# Patient Record
Sex: FEMALE | Race: WHITE | Employment: OTHER | URBAN - METROPOLITAN AREA
[De-identification: names, ages, dates, MRNs, and addresses within clinical notes are randomized per-mention and may not be internally consistent; named-entity substitution may affect disease eponyms.]

---

## 2019-04-03 NOTE — PROGRESS NOTES
Jesus Martinez MD [facility]  [address],  [address]  [place], [address] [facility]    Office [phone]  Fax [phone]  Cell [phone]    Jennifer Acosta is a 68 y.o. female self-referred for evaluation of ongoing evaluation and management of HTN and valvular disease  Assessment  Encounter Diagnoses   Name Primary?  Essential hypertension Yes    Shortness of breath     Nonrheumatic aortic valve insufficiency     Non-rheumatic mitral regurgitation     Severe obesity (HCC)      Recommendations:  Chronic HTN, controlled on combination therapy. Valvular disease with moderate MR and AR by echo 1 year ago in [place]. She has no murmurs suggestive of either. Doubt she has significant valve disease. Will repeat echo in 1 year. She has no significant exertional sxs at a a good functional capacity. She will be seeing Dr. Jenny Byrd in the near future to establish care. Will see her back in 1 year. Follow-up and Dispositions    · Return in about 1 year (around 4/4/2020). Subjective:  Mrs. Anne-Marie Hale recently moved from [place] to [address] [place] [address] 3/15/19 to live closer to her son. She was seen by Dr Arley Kwon in [place]. Stress nuclear study in 2013 was abnormal, leading to a cath, which was reportedly normal. She has chronic HTN and valvular disease with moderate MR/AR by echo a year ago. No hx of HF or AF. She is adherent with Metoprolol and Losartan for HTN. She states she is adjusting to her medications. She has some shortness of breath due to seasonal allergies from time to time. She reports some stress due to changes. She also states her  recently passed away. She is now living in an apartment. She is able to drive herself. She sleeps well at night. Her appetite is good. Patient denies any exertional chest pain,, palpitations, syncope, orthopnea, edema or paroxysmal nocturnal dyspnea. She does not have DM. She has never smoked.      Cardiac risk factors   HTN yes  DM no  Smoking no    Cardiac testing  Echo 2/28/18 - EF 60%, moderate AR, moderate MR, moderate LAE  Aortic duplex 2/28/18  - Normal    No past medical history on file. No Known Allergies      July 2018. Relocated to [facility] from [place]. Review of Systems  Constitutional: Negative for fever, chills, malaise/fatigue and diaphoresis. Respiratory: Negative for cough, hemoptysis, sputum production, shortness of breath and wheezing. Cardiovascular: Negative for chest pain, palpitations, orthopnea, claudication, leg swelling and PND. Gastrointestinal: Negative for heartburn, nausea, vomiting, blood in stool and melena. Genitourinary: Negative for dysuria and flank pain. Musculoskeletal: Negative for joint pain and back pain. Skin: Negative for rash. Neurological: Negative for focal weakness, seizures, loss of consciousness, weakness and headaches. Endo/Heme/Allergies: Does not bruise/bleed easily. Psychiatric/Behavioral: Negative for memory loss. The patient does not have insomnia. Physical Exam  Visit Vitals  /82 (BP 1 Location: Left arm, BP Patient Position: Sitting)   Pulse 68   Ht 4' 10\" (1.473 m)   Wt 171 lb 12.8 oz (77.9 kg)   SpO2 97%   BMI 35.91 kg/m²     Wt Readings from Last 3 Encounters:   04/04/19 171 lb 12.8 oz (77.9 kg)      General - well developed well nourished  Neck - JVP normal, thyroid nl  Cardiac - normal S1, S2, no murmurs, rubs or gallops. No clicks. Occasional extra systoles.    Vascular - carotids without bruits, radials, femorals and pedal pulses equal bilateral  Lungs - clear to auscultation bilaterals, no rales, wheezing or rhonchi  Abd - soft nontender, no HSM, no abd bruits  Extremities - no edema  Skin - no rash  Neuro - nonfocal  Psych - normal mood and affect    Cardiographics  EKG 4/4/19 - SR, normal EKG  Echo 2/28/18 - EF 60%, moderate AR, moderate MR, moderate LAE  Aortic duplex 2/28/18  - Normal    Written by Oscar Valdivia, as dictated by Dr. Veronica Herring.    Veronica Herring MD

## 2019-04-04 ENCOUNTER — OFFICE VISIT (OUTPATIENT)
Dept: CARDIOLOGY CLINIC | Age: 74
End: 2019-04-04

## 2019-04-04 VITALS
BODY MASS INDEX: 36.06 KG/M2 | WEIGHT: 171.8 LBS | DIASTOLIC BLOOD PRESSURE: 82 MMHG | HEIGHT: 58 IN | OXYGEN SATURATION: 97 % | SYSTOLIC BLOOD PRESSURE: 122 MMHG | HEART RATE: 68 BPM

## 2019-04-04 DIAGNOSIS — I34.0 NON-RHEUMATIC MITRAL REGURGITATION: ICD-10-CM

## 2019-04-04 DIAGNOSIS — I35.1 NONRHEUMATIC AORTIC VALVE INSUFFICIENCY: ICD-10-CM

## 2019-04-04 DIAGNOSIS — R06.02 SHORTNESS OF BREATH: ICD-10-CM

## 2019-04-04 DIAGNOSIS — I10 ESSENTIAL HYPERTENSION: Primary | ICD-10-CM

## 2019-04-04 DIAGNOSIS — E66.01 SEVERE OBESITY (HCC): ICD-10-CM

## 2019-04-04 RX ORDER — LOSARTAN POTASSIUM 100 MG/1
100 TABLET ORAL DAILY
COMMUNITY
End: 2020-04-13 | Stop reason: SDUPTHER

## 2019-04-04 RX ORDER — METOPROLOL SUCCINATE 50 MG/1
100 TABLET, EXTENDED RELEASE ORAL DAILY
COMMUNITY
End: 2020-02-04 | Stop reason: SDUPTHER

## 2019-04-04 RX ORDER — HYDROCHLOROTHIAZIDE 25 MG/1
25 TABLET ORAL DAILY
COMMUNITY
End: 2020-05-22 | Stop reason: SDUPTHER

## 2019-04-04 RX ORDER — FLUTICASONE PROPIONATE 50 MCG
2 SPRAY, SUSPENSION (ML) NASAL DAILY
COMMUNITY
End: 2021-05-03

## 2019-04-04 RX ORDER — PRAVASTATIN SODIUM 20 MG/1
20 TABLET ORAL
COMMUNITY
End: 2020-02-04 | Stop reason: SDUPTHER

## 2019-04-04 RX ORDER — CLONAZEPAM 0.5 MG/1
TABLET ORAL
COMMUNITY
End: 2020-04-22

## 2019-04-04 RX ORDER — ALBUTEROL SULFATE 90 UG/1
AEROSOL, METERED RESPIRATORY (INHALATION)
COMMUNITY
End: 2019-09-09 | Stop reason: SDUPTHER

## 2019-04-04 NOTE — PROGRESS NOTES
Patient has shortness of breath sometimes Patient has swelling in feet sometimes Visit Vitals /82 (BP 1 Location: Left arm, BP Patient Position: Sitting) Pulse 68 Ht 4' 10\" (1.473 m) Wt 171 lb 12.8 oz (77.9 kg) SpO2 97% BMI 35.91 kg/m²

## 2019-04-11 ENCOUNTER — OFFICE VISIT (OUTPATIENT)
Dept: FAMILY MEDICINE CLINIC | Age: 74
End: 2019-04-11

## 2019-04-11 VITALS
BODY MASS INDEX: 36.36 KG/M2 | HEART RATE: 52 BPM | SYSTOLIC BLOOD PRESSURE: 132 MMHG | WEIGHT: 173.2 LBS | HEIGHT: 58 IN | RESPIRATION RATE: 18 BRPM | DIASTOLIC BLOOD PRESSURE: 67 MMHG | TEMPERATURE: 97.4 F

## 2019-04-11 DIAGNOSIS — I10 ESSENTIAL HYPERTENSION: Primary | ICD-10-CM

## 2019-04-11 DIAGNOSIS — E66.01 SEVERE OBESITY (HCC): ICD-10-CM

## 2019-04-11 DIAGNOSIS — Z12.11 SCREENING FOR COLON CANCER: ICD-10-CM

## 2019-04-11 NOTE — PROGRESS NOTES
Chief Complaint Patient presents with [facility] 1. Have you been to the ER, urgent care clinic since your last visit? Hospitalized since your last visit? No 
 
2. Have you seen or consulted any other health care providers outside of the [facility] since your last visit? Include any pap smears or colon screening.  No

## 2019-04-11 NOTE — PROGRESS NOTES
Family Medicine Initial Office Visit Patient: Evin Zavaal 1945, 69 y.o., female Encounter Date: 4/11/2019 ASSESSMENT & PLAN 
  ICD-10-CM ICD-9-CM 1. Essential hypertension I10 401.9 2. Screening for colon cancer Z12.11 V76.51 REFERRAL TO GASTROENTEROLOGY 3. Severe obesity (HCC) E66.01 278.01 Orders Placed This Encounter  [facility] Referral Priority:   Routine Referral Type:   Consultation Referral Reason:   Specialty Services Required Referral Location:   [facility]  
  Referred to Provider:   Justin Reed MD  
  Number of Visits Requested:   1 The patient is here today to establish care and is overall doing well. She is following with cardiology, she recently moved to the area and has already establish care We will request records from her prior PCP Reports that she is due for colonoscopy and that she recently had lab work done with her PCP which we will request records of It encouraged her to exercise and follow a healthy diet with a goal of losing weight and maintaining cardiovascular and physical fitness She reports she is up-to-date on mammogram and will request this record She is status post hysterectomy and does not require Pap smears I did discuss with her the shingles vaccine today I will see her back in 4-6 months or on an as-needed basis CHIEF COMPLAINT Chief Complaint Patient presents with Dc Establish Care SUBJECTIVE Keila Murguia is a 69 y.o. female presenting today for establishing care. Recently moved down here from [place] near [facility]. Patient works as retired. 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 Patient lives in a apt with alone Patient was last seen by primary care march 4 (Dr Yasmeen Reyes) Patient last saw a dentist within the last yanna Patient last had eye exam within the last year Saw cardiology in [place], Dr Vanessa Rivera, recently established with Dr Shannon Burden Exercise: not regularly Diet / Weight:not eating the healthiest diet, likes salty foods/crunchy snacks, limited vegetables, she reports she's more of a grazer and likes breakfast. Weight is staying the same Tobacco:no EtOH:3 bottles of wine a week, drinking 1-2 glasses of wine a day Illicit Substances: no 
 
Sexual Activity: not presently History of high blood pressure, well controlled on meds she's on Recenlty lost her son and her  within the span of 2 days--son had an MI and was found dead at home,  was sick but hid it from family Review of Systems A 12 point review of systems was negative except as noted here or in the HPI. OBJECTIVE Visit Vitals /67 (BP 1 Location: Left arm, BP Patient Position: Sitting) Pulse (!) 52 Temp 97.4 °F (36.3 °C) (Oral) Resp 18 Ht 4' 10\" (1.473 m) Wt 173 lb 3.2 oz (78.6 kg) BMI 36.20 kg/m² Physical Exam  
Constitutional: She is oriented to person, place, and time. She appears well-developed and well-nourished. No distress. NAD, Nontoxic, Appears Stated Age, obese HENT:  
Head: Normocephalic and atraumatic. Mouth/Throat: Oropharynx is clear and moist.  
Eyes: Conjunctivae and EOM are normal. Right eye exhibits no discharge. Left eye exhibits no discharge. No scleral icterus. Wearing glasses Neck: Neck supple. Cardiovascular: Regular rhythm and normal heart sounds. No murmur heard. Occasional PVCs, bradycardia today Pulmonary/Chest: Effort normal and breath sounds normal. No stridor. No respiratory distress. She has no wheezes. She has no rales. Abdominal: Soft. Bowel sounds are normal. She exhibits no distension. There is no tenderness. Musculoskeletal: She exhibits no edema or tenderness. Neurological: She is alert and oriented to person, place, and time. Grossly intact CN Skin: Skin is warm and dry. No rash noted. She is not diaphoretic. Psychiatric: She has a normal mood and affect. Her behavior is normal.  
Tearful at times, appropriately so when discussing the loss of her left ones Nursing note and vitals reviewed. No results found for any visits on 04/11/19. HISTORICAL Reviewed and updated today, and as noted below: 
 
Past Medical History:  
Diagnosis Date  Arthritis  Asthma  Calculus of kidney  Congestive heart failure ([facility] [address]  Hypertension History reviewed. No pertinent surgical history. Family History Problem Relation Age of Onset  Cancer Mother  Stroke Father  Heart Disease Father Social History Tobacco Use Smoking Status Never Smoker Smokeless Tobacco Never Used Social History Socioeconomic History  Marital status: SINGLE Spouse name: Not on file  Number of children: Not on file  Years of education: Not on file  Highest education level: Not on file Tobacco Use  Smoking status: Never Smoker  Smokeless tobacco: Never Used Substance and Sexual Activity  Alcohol use: Yes No Known Allergies No results found for any previous visit. Nat Jackson MD 
[address] 04/11/19 9:13 AM 
 
Portions of this note may have been populated using smart dictation software and may have \"sounds-like\" errors present.

## 2019-09-09 ENCOUNTER — OFFICE VISIT (OUTPATIENT)
Dept: FAMILY MEDICINE CLINIC | Age: 74
End: 2019-09-09

## 2019-09-09 VITALS
RESPIRATION RATE: 18 BRPM | TEMPERATURE: 98.1 F | HEIGHT: 58 IN | SYSTOLIC BLOOD PRESSURE: 145 MMHG | HEART RATE: 52 BPM | BODY MASS INDEX: 36.73 KG/M2 | DIASTOLIC BLOOD PRESSURE: 71 MMHG | WEIGHT: 175 LBS | OXYGEN SATURATION: 100 %

## 2019-09-09 DIAGNOSIS — E78.5 HYPERLIPIDEMIA, UNSPECIFIED HYPERLIPIDEMIA TYPE: ICD-10-CM

## 2019-09-09 DIAGNOSIS — Z11.59 ENCOUNTER FOR HEPATITIS C SCREENING TEST FOR LOW RISK PATIENT: ICD-10-CM

## 2019-09-09 DIAGNOSIS — M94.9 DISORDER OF BONE AND CARTILAGE: ICD-10-CM

## 2019-09-09 DIAGNOSIS — M89.9 DISORDER OF BONE AND CARTILAGE: ICD-10-CM

## 2019-09-09 DIAGNOSIS — I10 ESSENTIAL HYPERTENSION: Primary | ICD-10-CM

## 2019-09-09 DIAGNOSIS — Z23 NEED FOR VACCINATION: ICD-10-CM

## 2019-09-09 DIAGNOSIS — N95.1 MENOPAUSAL STATE: ICD-10-CM

## 2019-09-09 DIAGNOSIS — E66.01 SEVERE OBESITY (HCC): ICD-10-CM

## 2019-09-09 DIAGNOSIS — I49.9 CARDIAC ARRHYTHMIA, UNSPECIFIED CARDIAC ARRHYTHMIA TYPE: ICD-10-CM

## 2019-09-09 PROBLEM — K22.70 BARRETT'S ESOPHAGUS: Status: ACTIVE | Noted: 2019-09-09

## 2019-09-09 PROBLEM — K57.30 DIVERTICULAR DISEASE OF COLON: Status: ACTIVE | Noted: 2019-09-09

## 2019-09-09 RX ORDER — ALBUTEROL SULFATE 90 UG/1
2 AEROSOL, METERED RESPIRATORY (INHALATION)
Qty: 1 INHALER | Refills: 1 | Status: SHIPPED | OUTPATIENT
Start: 2019-09-09 | End: 2020-10-25 | Stop reason: SDUPTHER

## 2019-09-09 RX ORDER — VITAMIN E CAP 100 UNIT 100 UNIT
CAP ORAL DAILY
COMMUNITY
End: 2021-08-19

## 2019-09-09 NOTE — PROGRESS NOTES
Chief Complaint   Patient presents with    Hypertension     Follow up     1. Have you been to the ER, urgent care clinic since your last visit? Hospitalized since your last visit? No    2. Have you seen or consulted any other health care providers outside of the [address] [place] [address] since your last visit? Include any pap smears or colon screening. No    Provider notified patient's B/P 145/71.

## 2019-09-09 NOTE — PROGRESS NOTES
Family Medicine Follow-Up Progress Note  Patient: Anabel Zavala  1945, 69 y.o., female  Encounter Date: 2019    ASSESSMENT & PLAN    ICD-10-CM ICD-9-CM    1. Essential hypertension I10 401.9 CBC W/O DIFF      METABOLIC PANEL, COMPREHENSIVE   2. Menopausal state N95.1 627.2 DEXA BONE DENSITY STUDY AXIAL   3. Disorder of bone and cartilage M89.9 733.90 DEXA BONE DENSITY STUDY AXIAL    M94.9     4. Encounter for hepatitis C screening test for low risk patient Z11.59 V73.89 HEPATITIS C AB   5. Need for vaccination Z23 V05.9 pneumococcal 13 naomi conj dip (PREVNAR 13, PF,) 0.5 mL syrg injection      diphtheria-pertussis, acellular,-tetanus (BOOSTRIX TDAP) 2.5-8-5 Lf-mcg-Lf/0.5mL susp susp      varicella-zoster (SHINGRIX ADJUVANT COMPONENT-PF) injection   6. Severe obesity ([facility] [address] E66.01 278.01    7. Cardiac arrhythmia, unspecified cardiac arrhythmia type I49.9 427.9 AMB POC EKG ROUTINE W/ 12 LEADS, INTER & REP   8. Hyperlipidemia, unspecified hyperlipidemia type E78.5 272.4 LIPID PANEL      METABOLIC PANEL, COMPREHENSIVE       Orders Placed This Encounter    DEXA BONE DENSITY STUDY AXIAL     Standing Status:   Future     Standing Expiration Date:   10/9/2020     Order Specific Question:   Reason for Exam     Answer:   screening, post menopausal woman    HEPATITIS C AB    CBC W/O DIFF    LIPID PANEL    METABOLIC PANEL, COMPREHENSIVE    AMB POC EKG ROUTINE W/ 12 LEADS, INTER & REP     Order Specific Question:   Reason for Exam:     Answer:   irreg hr    vitamin e (E GEMS) 100 unit capsule     Sig: Take  by mouth daily.  pneumococcal 13 naomi conj dip (PREVNAR 13, PF,) 0.5 mL syrg injection     Si.5 mL by IntraMUSCular route once for 1 dose. Dispense:  1 Syringe     Refill:  0    diphtheria-pertussis, acellular,-tetanus (BOOSTRIX TDAP) 2.5-8-5 Lf-mcg-Lf/0.5mL susp susp     Si.5 mL by IntraMUSCular route once for 1 dose.  Indications: Treatment to Prevent Diphtheria, Pertussis and Tetanus Dispense:  0.5 mL     Refill:  0    varicella-zoster (SHINGRIX ADJUVANT COMPONENT-PF) injection     Si Each by IntraMUSCular route once for 1 dose. Indications: Prevention of Shingles     Dispense:  1 Each     Refill:  1    albuterol (PROAIR HFA) 90 mcg/actuation inhaler     Sig: Take 2 Puffs by inhalation every six (6) hours as needed for Wheezing. Dispense:  1 Inhaler     Refill:  1       Patient Instructions   Here today in follow-up, blood pressure ranging from low to moderate control at home. Very mildly elevated here in the office today. Perhaps due to stress or anxiety  Many breakthrough beats on auscultation and palpation of pulses and so we grabbed an EKG showing PACs with sinus bradycardia as the underlying rhythm. The patient is asymptomatic  She continues to follow with cardiology  Due for DEXA scan is ordered  Due for hep C screening as ordered  Due for fasting labs as ordered given history of hypertension and also hyperlipidemia  Going to have a grandbaby soon and so I recommend the Tdap if not administered in the last 10 years, I did review records from her prior doctor's office and I did not see a record of this  I also advised the shingles vaccine and also the Prevnar 13. She may also get the flu shot at her pharmacy  Encouraged her to follow a healthy diet, exercise regularly with a goal of moderate weight loss and improved cardiovascular fitness and decrease cardiovascular risk factors  4-month follow-up or sooner on an as-needed basis call with questions or concerns        CHIEF COMPLAINT  Chief Complaint   Patient presents with    Hypertension     Follow up       Luan Morin is a 69 y.o. female presenting today for htn follow up. Reports her bp has \"been crazy\" running from 90s/70s to 135/80s. She has been logging it now for about 2 wks. She has been drinking more etoh in the last 2 wks as well.  She is drinking gin every night for the last 2 wks, drinking 2-3 G&T for the last 2 wks, she wonders if it is related to mood. Reports recently had eye exam about 2 wks ago--no cataracts, screening for glaucoma, no floaters--[facility] in [place] [facility] [employer]  No palpitations, no chest pain or shortness of breath, no leg swelling  Reports she had a DEXA scan sometime in the past but more than 2 years ago  Due for colonoscopy screening, last was done in 2009. The patient plans to set this up and she does still have the paperwork from her last visit  History of hyperlipidemia, we have previously requested records from her prior doctor's office, last lipids drawn 6 months ago showed elevated total cholesterol, elevated LDL cholesterol and a good healthy cholesterol with an HD L of 70    Review of Systems  A 12 point review of systems was negative except as noted here or in the HPI. OBJECTIVE  Visit Vitals  /71 (BP 1 Location: Left arm, BP Patient Position: Sitting)   Pulse (!) 52   Temp 98.1 °F (36.7 °C) (Oral)   Resp 18   Ht 4' 10\" (1.473 m)   Wt 175 lb (79.4 kg)   SpO2 100%   BMI 36.58 kg/m²       Physical Exam   Constitutional: She is oriented to person, place, and time. She appears well-developed and well-nourished. No distress. NAD, Nontoxic, Appears Stated Age, obese   HENT:   Head: Normocephalic and atraumatic. Mouth/Throat: Oropharynx is clear and moist.   Eyes: Conjunctivae and EOM are normal. Right eye exhibits no discharge. Left eye exhibits no discharge. No scleral icterus. Wearing glasses   Neck: Neck supple. Cardiovascular: Regular rhythm, normal heart sounds and intact distal pulses. No murmur heard. Bradycardia with freq ectopic beats   Pulmonary/Chest: Effort normal and breath sounds normal. No stridor. No respiratory distress. She has no wheezes. She has no rales. Abdominal: Soft. Bowel sounds are normal. She exhibits no distension. There is no tenderness. Musculoskeletal: She exhibits no edema or tenderness.    Neurological: She is alert and oriented to person, place, and time. Grossly intact CN   Skin: Skin is warm and dry. No rash noted. She is not diaphoretic. Psychiatric: She has a normal mood and affect. Her behavior is normal.   Tearful at times, reports stressors   Nursing note and vitals reviewed. No results found for any visits on 09/09/19. HISTORICAL  Reviewed and updated today, and as noted below:    Past Medical History:   Diagnosis Date    Arthritis     Asthma     Calculus of kidney     Congestive heart failure ([facility] [address]     Hypertension      History reviewed. No pertinent surgical history. Family History   Problem Relation Age of Onset    Cancer Mother     Stroke Father     Heart Disease Father      Social History     Tobacco Use   Smoking Status Never Smoker   Smokeless Tobacco Never Used     Social History     Socioeconomic History    Marital status: SINGLE     Spouse name: Not on file    Number of children: Not on file    Years of education: Not on file    Highest education level: Not on file   Tobacco Use    Smoking status: Never Smoker    Smokeless tobacco: Never Used   Substance and Sexual Activity    Alcohol use: Yes     No Known Allergies    No visits with results within 3 Month(s) from this visit. Latest known visit with results is:   No results found for any previous visit. Laisha Chi MD  [facility]  09/09/19 10:22 AM    Portions of this note may have been populated using smart dictation software and may have \"sounds-like\" errors present. Pt was counseled on risks, benefits and alternatives of treatment options. All questions were asked and answered and the patient was agreeable with the treatment plan as outlined.

## 2019-09-09 NOTE — PATIENT INSTRUCTIONS
Here today in follow-up, blood pressure ranging from low to moderate control at home. Very mildly elevated here in the office today. Perhaps due to stress or anxiety  Many breakthrough beats on auscultation and palpation of pulses and so we grabbed an EKG showing PACs with sinus bradycardia as the underlying rhythm. The patient is asymptomatic  She continues to follow with cardiology  Due for DEXA scan is ordered  Due for hep C screening as ordered  Due for fasting labs as ordered given history of hypertension and also hyperlipidemia  Going to have a grandbaby soon and so I recommend the Tdap if not administered in the last 10 years, I did review records from her prior doctor's office and I did not see a record of this  I also advised the shingles vaccine and also the Prevnar 13.   She may also get the flu shot at her pharmacy  Encouraged her to follow a healthy diet, exercise regularly with a goal of moderate weight loss and improved cardiovascular fitness and decrease cardiovascular risk factors  4-month follow-up or sooner on an as-needed basis call with questions or concerns

## 2019-09-10 LAB
ALBUMIN SERPL-MCNC: 4.5 G/DL (ref 3.5–4.8)
ALBUMIN/GLOB SERPL: 2 {RATIO} (ref 1.2–2.2)
ALP SERPL-CCNC: 51 IU/L (ref 39–117)
ALT SERPL-CCNC: 12 IU/L (ref 0–32)
AST SERPL-CCNC: 19 IU/L (ref 0–40)
BILIRUB SERPL-MCNC: 0.4 MG/DL (ref 0–1.2)
BUN SERPL-MCNC: 17 MG/DL (ref 8–27)
BUN/CREAT SERPL: 22 (ref 12–28)
CALCIUM SERPL-MCNC: 9.2 MG/DL (ref 8.7–10.3)
CHLORIDE SERPL-SCNC: 101 MMOL/L (ref 96–106)
CHOLEST SERPL-MCNC: 222 MG/DL (ref 100–199)
CO2 SERPL-SCNC: 29 MMOL/L (ref 20–29)
CREAT SERPL-MCNC: 0.78 MG/DL (ref 0.57–1)
ERYTHROCYTE [DISTWIDTH] IN BLOOD BY AUTOMATED COUNT: 12.5 % (ref 12.3–15.4)
GLOBULIN SER CALC-MCNC: 2.2 G/DL (ref 1.5–4.5)
GLUCOSE SERPL-MCNC: 100 MG/DL (ref 65–99)
HCT VFR BLD AUTO: 40.2 % (ref 34–46.6)
HCV AB S/CO SERPL IA: <0.1 S/CO RATIO (ref 0–0.9)
HDLC SERPL-MCNC: 73 MG/DL
HGB BLD-MCNC: 13.2 G/DL (ref 11.1–15.9)
INTERPRETATION, 910389: NORMAL
LDLC SERPL CALC-MCNC: 131 MG/DL (ref 0–99)
MCH RBC QN AUTO: 30.8 PG (ref 26.6–33)
MCHC RBC AUTO-ENTMCNC: 32.8 G/DL (ref 31.5–35.7)
MCV RBC AUTO: 94 FL (ref 79–97)
PLATELET # BLD AUTO: 218 X10E3/UL (ref 150–450)
POTASSIUM SERPL-SCNC: 4.4 MMOL/L (ref 3.5–5.2)
PROT SERPL-MCNC: 6.7 G/DL (ref 6–8.5)
RBC # BLD AUTO: 4.28 X10E6/UL (ref 3.77–5.28)
SODIUM SERPL-SCNC: 143 MMOL/L (ref 134–144)
TRIGL SERPL-MCNC: 92 MG/DL (ref 0–149)
VLDLC SERPL CALC-MCNC: 18 MG/DL (ref 5–40)
WBC # BLD AUTO: 7.5 X10E3/UL (ref 3.4–10.8)

## 2019-09-13 NOTE — PROGRESS NOTES
Hepatitis C screening negative  Blood counts all normal  Cholesterol LDL is elevated making total cholesterol elevated, already on pravastatin, no change to medications  Electrolytes kidney function and liver function all normal, blood sugar at the upper limit of normal we will recheck this with the next set of labs

## 2020-02-06 RX ORDER — PRAVASTATIN SODIUM 20 MG/1
20 TABLET ORAL
Qty: 90 TAB | Refills: 1 | Status: SHIPPED | OUTPATIENT
Start: 2020-02-06 | End: 2020-08-03 | Stop reason: SDUPTHER

## 2020-02-06 RX ORDER — PRAVASTATIN SODIUM 20 MG/1
20 TABLET ORAL
Qty: 90 TAB | Refills: 1 | OUTPATIENT
Start: 2020-02-06

## 2020-02-06 RX ORDER — METOPROLOL SUCCINATE 50 MG/1
100 TABLET, EXTENDED RELEASE ORAL DAILY
Qty: 180 TAB | Refills: 1 | Status: SHIPPED | OUTPATIENT
Start: 2020-02-06 | End: 2020-08-03 | Stop reason: SDUPTHER

## 2020-04-14 RX ORDER — CLONAZEPAM 0.5 MG/1
0.5 TABLET ORAL
Qty: 30 TAB | Refills: 0 | OUTPATIENT
Start: 2020-04-14

## 2020-04-14 RX ORDER — LOSARTAN POTASSIUM 100 MG/1
100 TABLET ORAL DAILY
Qty: 90 TAB | Refills: 0 | Status: SHIPPED | OUTPATIENT
Start: 2020-04-14 | End: 2020-07-22 | Stop reason: SDUPTHER

## 2020-04-14 NOTE — TELEPHONE ENCOUNTER
Never had clonazepam from our office. Needs visit to discuss, can do virtual. Losartan approved.  Please schedule pt for virtual visit

## 2020-04-14 NOTE — TELEPHONE ENCOUNTER
Pt returned call, states she's already scheduled for virtual visit next week and is okay to wait for refill until then as this is only an as needed medication. Pt agrees to call office for sooner  appointment if needed.  Aristides

## 2020-04-14 NOTE — TELEPHONE ENCOUNTER
Called pt and left a voice message, asking that she call the office back in regards to her medications.

## 2020-04-22 ENCOUNTER — VIRTUAL VISIT (OUTPATIENT)
Dept: FAMILY MEDICINE CLINIC | Age: 75
End: 2020-04-22

## 2020-04-22 VITALS
WEIGHT: 175 LBS | DIASTOLIC BLOOD PRESSURE: 80 MMHG | HEIGHT: 58 IN | BODY MASS INDEX: 36.73 KG/M2 | SYSTOLIC BLOOD PRESSURE: 123 MMHG

## 2020-04-22 DIAGNOSIS — K22.719 BARRETT'S ESOPHAGUS WITH DYSPLASIA: ICD-10-CM

## 2020-04-22 DIAGNOSIS — K57.30 DIVERTICULAR DISEASE OF COLON: ICD-10-CM

## 2020-04-22 DIAGNOSIS — E66.01 SEVERE OBESITY (HCC): ICD-10-CM

## 2020-04-22 DIAGNOSIS — F41.8 DEPRESSION WITH ANXIETY: ICD-10-CM

## 2020-04-22 DIAGNOSIS — Z71.89 ADVANCED DIRECTIVES, COUNSELING/DISCUSSION: ICD-10-CM

## 2020-04-22 DIAGNOSIS — Z13.31 SCREENING FOR DEPRESSION: ICD-10-CM

## 2020-04-22 DIAGNOSIS — Z12.39 BREAST CANCER SCREENING: ICD-10-CM

## 2020-04-22 DIAGNOSIS — G47.00 INSOMNIA, UNSPECIFIED TYPE: ICD-10-CM

## 2020-04-22 DIAGNOSIS — Z00.00 MEDICARE ANNUAL WELLNESS VISIT, SUBSEQUENT: Primary | ICD-10-CM

## 2020-04-22 DIAGNOSIS — Z12.11 SCREEN FOR COLON CANCER: ICD-10-CM

## 2020-04-22 DIAGNOSIS — Z13.39 SCREENING FOR ALCOHOLISM: ICD-10-CM

## 2020-04-22 DIAGNOSIS — Z12.31 ENCOUNTER FOR SCREENING MAMMOGRAM FOR MALIGNANT NEOPLASM OF BREAST: ICD-10-CM

## 2020-04-22 DIAGNOSIS — F10.90 HABITUAL ALCOHOL USE: ICD-10-CM

## 2020-04-22 DIAGNOSIS — E78.5 HYPERLIPIDEMIA, UNSPECIFIED HYPERLIPIDEMIA TYPE: ICD-10-CM

## 2020-04-22 DIAGNOSIS — I10 BENIGN ESSENTIAL HYPERTENSION: ICD-10-CM

## 2020-04-22 RX ORDER — CITALOPRAM 20 MG/1
TABLET, FILM COATED ORAL
Qty: 90 TAB | Refills: 0 | Status: SHIPPED | OUTPATIENT
Start: 2020-04-22 | End: 2020-07-22 | Stop reason: SDUPTHER

## 2020-04-22 NOTE — PATIENT INSTRUCTIONS
1. Medicare annual wellness visit, subsequent Done today per protocol 2. Breast cancer screening Due, ordered, defer until routine imaging has resumed after loosening of COVID 19 restrictions - [facility] MAMMO BI SCREENING INCL CAD; Future 3. Screening for alcoholism See chart, ordered and counselin discussed - [place] ANNUAL ALCOHOL SCREEN 15 MIN 4. Screening for depression Per protocol, + PHQ, discussed and see below - Beena Mcfarland 5. Screen for colon cancer Defer cscope for cologuard for now b/c of risk for COVID19 exposure 
- COLOGUARD TEST (FECAL DNA COLORECTAL CANCER SCREENING) 6. Body mass index 36.0-36.9, adult Encourage on weight loss through healthy eating, exercise, with a goal of moderate weight loss and modification of cv risk factors - [place] BEHAVIOR  OBESITY 15M 7. Advanced directives, counseling/discussion See documentation, patient to bring her ACP document to our office or fax - ADVANCE CARE PLANNING FIRST 30 MINS 8. Encounter for screening mammogram for malignant neoplasm of breast  
As noted above - [facility] MAMMO BI SCREENING INCL CAD; Future 9. Benign essential hypertension HTN appears to be at goal, encourage med compliance and lifestyle changes as documented above 10. Cat's esophagus with dysplasia F/u with GI when able after COVID19 restrictions are loosened 11. Diverticular disease of colon As above 12. Hyperlipidemia, unspecified hyperlipidemia type C/w medication without changes, labs due in september 13. Severe obesity ([address] As above 14. Habitual alcohol use Counseling today, recommend limit to 7 or less drinks per week and monitor for interaction with medications (klonopin not safe with etoh for example) 15. Depression with anxiety Long discussion, determined med mgmt is appropriate.  Pt agrees to SSRI, which if anxiety is causing insomnia may help with insomnia in addition to depression and anxiety. Discuss choices, decided to try celexa. Discussed r/b/a and patient agreeable to titration plan, start with 1/2 tab for 2 - 4 wk then can increase to 20mg after that, 4-6 wk follow up appt for med check 16. Insomnia, unspecified type As above, avoid benzos, can trial melatnonin, unisom Encounter time today including face to face, chart review and documentation was 54 minutes and more than 50% of this encounter was spent in counseling face-to-face regarding Diagnosis, Patient Education, Medication Management, Compliance and Impressions.

## 2020-04-22 NOTE — PROGRESS NOTES
Pt is completing virtual appointment at in [place], [place]. Pt advises she had immunizations, TDAP and pneumococcal at [facility], will request records. This is the Subsequent Medicare Annual Wellness Exam, performed 12 months or more after the Initial AWV or the last Subsequent AWV    Consent: Kulwant Enamorado, who was seen by synchronous (real-time) audio-video technology, and/or her healthcare decision maker, is aware that this patient-initiated, Telehealth encounter on 4/22/2020 is a billable service. While AWVs are fully covered by Medicare, any services rendered on this date that are not included in an AWV are subject to additional billing, with coverage as determined by her insurance carrier. She is aware that she may receive a bill for any such additional services and has provided verbal consent to proceed: Yes. I have reviewed the patient's medical history in detail and updated the computerized patient record. History     Patient Active Problem List   Diagnosis Code    Severe obesity ([facility] [address] E66.01    Benign essential hypertension I10    Cat's esophagus K22.70    Diverticular disease of colon K57.30    Hyperlipidemia E78.5     Past Medical History:   Diagnosis Date    Arthritis     Asthma     Calculus of kidney     Congestive heart failure ([facility] [address]     Hypertension       History reviewed. No pertinent surgical history. Current Outpatient Medications   Medication Sig Dispense Refill    losartan (COZAAR) 100 mg tablet Take 1 Tab by mouth daily. 90 Tab 0    metoprolol succinate (TOPROL-XL) 50 mg XL tablet Take 2 Tabs by mouth daily. 180 Tab 1    pravastatin (PRAVACHOL) 20 mg tablet Take 1 Tab by mouth nightly. 90 Tab 1    vitamin e (E GEMS) 100 unit capsule Take  by mouth daily.  albuterol (PROAIR HFA) 90 mcg/actuation inhaler Take 2 Puffs by inhalation every six (6) hours as needed for Wheezing.  1 Inhaler 1    hydroCHLOROthiazide (HYDRODIURIL) 25 mg tablet Take 25 mg by mouth daily.      fluticasone propionate (FLONASE ALLERGY RELIEF) 50 mcg/actuation nasal spray 2 Sprays by Both Nostrils route daily.  cholecalciferol, vitamin D3, (VITAMIN D3 PO) Take  by mouth.  clonazePAM (KLONOPIN) 0.5 mg tablet Take  by mouth nightly as needed.        No Known Allergies    Family History   Problem Relation Age of Onset    Cancer Mother     Stroke Father     Heart Disease Father      Social History     Tobacco Use    Smoking status: Never Smoker    Smokeless tobacco: Never Used   Substance Use Topics    Alcohol use: Yes       Depression Risk Factor Screening:     3 most recent PHQ Screens 9/9/2019   Little interest or pleasure in doing things More than half the days   Feeling down, depressed, irritable, or hopeless More than half the days   Total Score PHQ 2 4   Trouble falling or staying asleep, or sleeping too much Nearly every day   Feeling tired or having little energy Not at all   Poor appetite, weight loss, or overeating Not at all   Feeling bad about yourself - or that you are a failure or have let yourself or your family down More than half the days   Trouble concentrating on things such as school, work, reading, or watching TV Not at all   Moving or speaking so slowly that other people could have noticed; or the opposite being so fidgety that others notice Not at all   Thoughts of being better off dead, or hurting yourself in some way Not at all   PHQ 9 Score 9   How difficult have these problems made it for you to do your work, take care of your home and get along with others Somewhat difficult       Alcohol Risk Factor Screening:   Do you average 1 drink per night or more than 7 drinks a week:  No    On any one occasion in the past three months have you have had more than 3 drinks containing alcohol:  Yes    AUDIT Screen Score: AUDIT Score: 7  Patient is drinking gin, 2 drinks per day, 4 days per week--not measuring out shots  Functional Ability and Level of Safety: Hearing: Hearing is good. Activities of Daily Living: The home contains: grab bars    ADL Assessment 4/22/2020   Feeding yourself No Help Needed   Getting from bed to chair No Help Needed   Getting dressed No Help Needed   Bathing or showering No Help Needed   Walk across the room (includes cane/walker) No Help Needed   Using the telphone No Help Needed   Taking your medications No Help Needed   Preparing meals No Help Needed   Managing money (expenses/bills) No Help Needed   Moderately strenuous housework (laundry) No Help Needed   Shopping for personal items (toiletries/medicines) No Help Needed   Shopping for groceries No Help Needed   Driving No Help Needed   Climbing a flight of stairs No Help Needed   Getting to places beyond walking distances No Help Needed       Ambulation: with no difficulty    Fall Risk:  Fall Risk Assessment, last 12 mths 9/9/2019   Able to walk? Yes   Fall in past 12 months? No       Abuse Screen:  Abuse Screening Questionnaire 4/22/2020   Do you ever feel afraid of your partner? N   Are you in a relationship with someone who physically or mentally threatens you? N   Is it safe for you to go home?  Y     Cognitive Screening   Has your family/caregiver stated any concerns about your memory: no  Cognitive Screening: Normal - Verbal Fluency Test  Tavo   Super  Sit  Size  Set  Don Hires  [place]  Sitting  Staying  [address]  [employer]  Sports  Sporting  Sports arena  seagrams  Suffer  Slight  silly  Patient Care Team   Patient Care Team:  Augustus Pathak MD as PCP - General (Family Practice)  Augustus Pathak MD as PCP - [facility] Empaneled Provider    Assessment/Plan   Education and counseling provided:  Are appropriate based on today's review and evaluation  End-of-Life planning (with patient's consent)  Pneumococcal Vaccine  Influenza Vaccine  Screening Mammography  Colorectal cancer screening tests  Screening for glaucoma  Diabetes screening test    Diagnoses and all orders for this visit:    1. Breast cancer screening  -     EDILIA MAMMO BI SCREENING INCL CAD; Future    2. Medicare annual wellness visit, subsequent    3. Screening for alcoholism  -     NE ANNUAL ALCOHOL SCREEN 15 MIN    4. Screening for depression  -     DEPRESSION SCREEN ANNUAL    5. Screen for colon cancer  -     COLOGUARD TEST (FECAL DNA COLORECTAL CANCER SCREENING)    6. Body mass index 36.0-36.9, adult  -     NE BEHAVIOR  OBESITY 15M    7. Advanced directives, counseling/discussion  -     ADVANCE CARE PLANNING FIRST 30 MINS    8. Encounter for screening mammogram for malignant neoplasm of breast   -     EDILIA MAMMO BI SCREENING INCL CAD; Future        Health Maintenance Due   Topic Date Due    DTaP/Tdap/Td series (1 - Tdap) 04/11/1967    Breast Cancer Screen Mammogram  04/11/1996    FOBT Q1Y Age 50-75  04/11/1996    Bone Densitometry (Dexa) Screening  04/11/2011    Pneumococcal 65+ years (1 of 1 - PPSV23) 04/11/2011    Medicare Yearly Exam  03/27/2019       Riley Zavala is a 77 y.o. female being evaluated by a video visit encounter for concerns as above. A caregiver was present when appropriate. Due to this being a TeleHealth encounter (During [facility]- public health emergency), evaluation of the following organ systems was limited: Vitals/Constitutional/EENT/Resp/CV/GI//MS/Neuro/Skin/Heme-Lymph-Imm. Pursuant to the emergency declaration under the [facility]5 waiver authority and the [employer] and [employer]ar General Act, this Virtual  Visit was conducted, with patient's (and/or legal guardian's) consent, to reduce the patient's risk of exposure to COVID-19 and provide necessary medical care. Services were provided through a video synchronous discussion virtually to substitute for in-person clinic visit. Patient and provider were located at their individual homes.     Yenni Ibarra MD

## 2020-04-22 NOTE — PROGRESS NOTES
Advance Care Planning       Advance Care Planning (ACP) Physician/NP/PA (Provider) Conversation      Date of ACP Conversation: 4/22/2020    Conversation Conducted with:   Patient with Decision Making Capacity    *If present, Decision Maker was asked to consider and make decisions based on patient values, known preferences, or best interests. Current Designated Health Care Decision Maker:   (as entered in [address] field)    If no Decision Maker listed above or available through scanned documents, then:    [address]:  Who do you trust to make healthcare decisions for you? Name: Humberto Walker (Daughter) phone  number: [phone]  Can this person be reached and be able to respond quickly, such as within a few minutes or hours? YES  Who would be your back-up decision maker? Name Donita Zavala phone [phone], [phone] (cell)      For below questions, when conducting conversation with Micaela, substitute \"she\" and \"her\" for \"you\" and \"your\". Hospitalization: \"If your health were to worsen and it became clear that your chance of recovery was unlikely, what would your preference be regarding hospitalization? \"  If the patient would want hospitalization, answer \"yes\". If the patient would prefer comfort-focused treatment without hospitalization, answer \"no\". yes, if care is futile, she requests a quick transition to comfort care and hospice      Ventilation: \"If you were in your present state of health and suddenly became very ill and were unable to breathe on your own, what would your preference be about the use of a ventilator (breathing machine) if it were available to you? \"    If patient would desire the use of a ventilator (breathing machine), answer \"yes\", if not answer \"no\":yes, as a temporary measure, patient stipulates if no improvement in 2 days, discontinue care, in favor of comfort measures.   \"If your health were to worsen and it became clear that your chance of recovery was unlikely, would that change your answer? \"   yes, if no chance of recovery, patient does not want to be placed on a ventilator    Resuscitation:  \"CPR works best to restart the heart when there is a sudden event, like a heart attack, in someone who is otherwise healthy. Unfortunately, CPR does not typically restart the heart for people who have serious health conditions or who are very sick. \"    \"In the event your heart stopped, would you want attempts to restart your heart (answer \"yes\" for attempt to resuscitate) or would you prefer a natural death (answer \"no\" for do not attempt to resuscitate)? \"   no  \"If your health were to worsen and it became clear that your chance of recovery was unlikely, would that change your answer? \"   no        Conversation Outcomes / Follow-Up Plan:   Recommend patient provide our office with a copy of her existing advanced directive which we discussed today. Length of ACP Conversation in minutes:  16 minutes    Coding Instructions:  ACP is eligible as a billable service through Medicare in conjunction with either an AWV or a problem-oriented E/M visit if it entails dedicated discussion >/=16 minutes. Other insurance carriers may or may not cover ACP services.   16-30 minutes: code 73879  >30 minutes: code [id number] modifier if reported with an E/M visit (deductible and co-pays may apply)  Add 33 modifier if reported with an AWV to waive patient cost-sharing

## 2020-04-22 NOTE — PROGRESS NOTES
Family Medicine Follow-Up Progress Note  Patient: Harpreet Zavala  1945, 77 y.o., female  Encounter Date: 4/22/2020    ASSESSMENT & PLAN    ICD-10-CM ICD-9-CM    1. Medicare annual wellness visit, subsequent Z00.00 V70.0    2. Breast cancer screening Z12.39 V76.10 EDILIA MAMMO BI SCREENING INCL CAD   3. Screening for alcoholism Z13.39 V79.1 IN ANNUAL ALCOHOL SCREEN 15 MIN   4. Screening for depression Z13.31 V79.0 DEPRESSION SCREEN ANNUAL   5. Screen for colon cancer Z12.11 V76.51 COLOGUARD TEST (FECAL DNA COLORECTAL CANCER SCREENING)   6. Body mass index 36.0-36.9, adult Z68.36 V85.36 IN BEHAVIOR  OBESITY 15M   7. Advanced directives, counseling/discussion Z71.89 V65.49 ADVANCE CARE PLANNING FIRST 30 MINS   8. Encounter for screening mammogram for malignant neoplasm of breast  Z12.31 V76.12 EDILIA MAMMO BI SCREENING INCL CAD   9. Benign essential hypertension I10 401.1    10. Cat's esophagus with dysplasia K22.719 530.85    11. Diverticular disease of colon K57.30 562.10    12. Hyperlipidemia, unspecified hyperlipidemia type E78.5 272.4    13. Severe obesity (HCC) E66.01 278.01    14. Habitual alcohol use Z72.89 V49.89    15. Depression with anxiety F41.8 300.4    16. Insomnia, unspecified type G47.00 780.52        Orders Placed This Encounter    Depression Screen Annual    ADVANCE CARE PLANNING FIRST 27 MINS    EDILIA MAMMO BI SCREENING INCL CAD     Standing Status:   Future     Standing Expiration Date:   5/22/2021    COLOGUARD TEST (FECAL DNA COLORECTAL CANCER SCREENING)    Annual  Alcohol Screen 15 min ()    Intense Behavioral Therapy for Obesity 15 min ()     citalopram (CELEXA) 20 mg tablet     Sig: Take 0.5 Tabs by mouth daily for 14 days, THEN 1 Tab daily for 90 days. Dispense:  90 Tab     Refill:  0       Patient Instructions   1. Medicare annual wellness visit, subsequent  Done today per protocol    2.  Breast cancer screening  Due, ordered, defer until routine imaging has resumed after loosening of COVID 19 restrictions  - [facility] MAMMO BI SCREENING INCL CAD; Future    3. Screening for alcoholism  See chart, ordered and counselin discussed  - [place] ANNUAL ALCOHOL SCREEN 15 MIN    4. Screening for depression  Per protocol, + PHQ, discussed and see below  - Beena Mcfarland    5. Screen for colon cancer  Defer cscope for cologuard for now b/c of risk for COVID19 exposure  - COLOGUARD TEST (FECAL DNA COLORECTAL CANCER SCREENING)    6. Body mass index 36.0-36.9, adult  Encourage on weight loss through healthy eating, exercise, with a goal of moderate weight loss and modification of cv risk factors  - AR BEHAVIOR  OBESITY 15M    7. Advanced directives, counseling/discussion  See documentation, patient to bring her ACP document to our office or fax  - ADVANCE CARE PLANNING FIRST 30 MINS    8. Encounter for screening mammogram for malignant neoplasm of breast   As noted above  - [facility] MAMMO BI SCREENING INCL CAD; Future    9. Benign essential hypertension  HTN appears to be at goal, encourage med compliance and lifestyle changes as documented above    10. Cat's esophagus with dysplasia  F/u with GI when able after COVID19 restrictions are loosened    11. Diverticular disease of colon  As above    12. Hyperlipidemia, unspecified hyperlipidemia type  C/w medication without changes, labs due in september    13. Severe obesity ([address]  As above    14. Habitual alcohol use  Counseling today, recommend limit to 7 or less drinks per week and monitor for interaction with medications (klonopin not safe with etoh for example)    15. Depression with anxiety  Long discussion, determined med mgmt is appropriate. Pt agrees to SSRI, which if anxiety is causing insomnia may help with insomnia in addition to depression and anxiety. Discuss choices, decided to try celexa.  Discussed r/b/a and patient agreeable to titration plan, start with 1/2 tab for 2 - 4 wk then can increase to 20mg after that, 4-6 wk follow up appt for med check    16. Insomnia, unspecified type  As above, avoid benzos, can trial melatnonin, unisom    Encounter time today including face to face, chart review and documentation was 54 minutes and more than 50% of this encounter was spent in counseling face-to-face regarding Diagnosis, Patient Education, Medication Management, Compliance and Impressions. CHIEF COMPLAINT  Chief Complaint   Patient presents with   [facility] Annual Wellness Visit       SUBJECTIVE  Mackenzie Zavala is a 77 y.o. female presenting today for AWV and also acute and chronic concerns  She reports knee pain is longstanding and hip pain comes and goes. She reports to me that the hip xray showed a kidney stone and nothing else but it continues to bother her. The knee xray int he past shows arthrits  The pain is annoying but not urgent, no recent injury or trauma--patient would be willing to wait for now on seeing ortho or having imaging--wants to wait until it is safer to go into a medical setting. HTN: patient reports stable on medications. No chest pain, sob, headache, blurred vision, leg swelling, diaphoresis, falls. Compliant with medications as prescribed. is checking blood pressures at home and reports range is 120-140s/60-80s. No significant hyper or hypotensive episodes that the patient reports today. Allergies: so far so good, she reports she's having no significant symptoms. She is using that albuterol pump from time to time, she does home PFT and uses albuterol if it drops low. She also uses the incentive spirometer. She is only using saline nasal spray for now. HLD: taking her cholesterol medication regularly, no side effects but compliant with meds    Insomnia/anxiety: she reports she falls asleep and she wakes up and in the past she would use klonopin 0.25 2-3 days a night.   Has never tried unisom or other otc sleep aids    Depression: PHQ 9 Score: 9 (9/9/2019 10:10 AM)  would want to be on medication--she was on zoloft in the past and it did not help. Also said she had been on xanax in the past and she had bad nightmares with it. She has been using klonopin but knows it's not ideal. Lost her  and son within 2 days of eachother and this continues to be difficult for her, especially during this time of isolation. Habitual etoh use: drinking 8+ drinks per week. Prev was drinking wine, now drinking gin. Reports she does not measure her drinks so probably each drink has more than a shot in it. Reports she \"could stop but doesn't want to. \" she knows that liver damage comes from etoh chronic use at higher levels and she reports that was a compliaction leading to her 's death. ROS  Review of Systems  A 12 point review of systems was negative except as noted here or in the HPI. OBJECTIVE  Visit Vitals  /80   Ht 4' 10\" (1.473 m)   Wt 175 lb (79.4 kg)   BMI 36.58 kg/m²       Physical Exam  Constitutional:       General: She is not in acute distress. Appearance: Normal appearance. She is not ill-appearing, toxic-appearing or diaphoretic. HENT:      Head: Normocephalic and atraumatic. Mouth/Throat:      Mouth: Mucous membranes are moist.   Eyes:      General: No scleral icterus. Right eye: No discharge. Left eye: No discharge. Extraocular Movements: Extraocular movements intact. Neck:      Comments: Neck appears to have rom without noticeable mass  Pulmonary:      Effort: Pulmonary effort is normal. No respiratory distress. Skin:     Comments: Not jaundice, bruising, lesion, pallor, erythema or rash on visible skin. Skin appears dry and well perfused   Neurological:      General: No focal deficit present. Mental Status: She is alert and oriented to person, place, and time. Psychiatric:         Behavior: Behavior normal.         Thought Content:  Thought content normal.         Judgment: Judgment normal.      Comments: Appears depressed No results found for any visits on 04/22/20. HISTORICAL  Reviewed and updated today, and as noted below:    Past Medical History:   Diagnosis Date    Arthritis     Asthma     Calculus of kidney     Congestive heart failure ([address]     Hypertension      History reviewed. No pertinent surgical history. Family History   Problem Relation Age of Onset    Cancer Mother     Stroke Father     Heart Disease Father      Social History     Tobacco Use   Smoking Status Never Smoker   Smokeless Tobacco Never Used     Social History     Socioeconomic History    Marital status: SINGLE     Spouse name: Not on file    Number of children: Not on file    Years of education: Not on file    Highest education level: Not on file   Tobacco Use    Smoking status: Never Smoker    Smokeless tobacco: Never Used   Substance and Sexual Activity    Alcohol use: Yes     No Known Allergies    LAB REVIEW  Lab Results   Component Value Date/Time    Sodium 143 09/09/2019 11:53 AM    Potassium 4.4 09/09/2019 11:53 AM    Chloride 101 09/09/2019 11:53 AM    CO2 29 09/09/2019 11:53 AM    Glucose 100 (H) 09/09/2019 11:53 AM    BUN 17 09/09/2019 11:53 AM    Creatinine 0.78 09/09/2019 11:53 AM    BUN/Creatinine ratio 22 09/09/2019 11:53 AM    GFR est AA 87 09/09/2019 11:53 AM    GFR est non-AA 76 09/09/2019 11:53 AM    Calcium 9.2 09/09/2019 11:53 AM    Bilirubin, total 0.4 09/09/2019 11:53 AM    AST (SGOT) 19 09/09/2019 11:53 AM    Alk.  phosphatase 51 09/09/2019 11:53 AM    Protein, total 6.7 09/09/2019 11:53 AM    Albumin 4.5 09/09/2019 11:53 AM    A-G Ratio 2.0 09/09/2019 11:53 AM    ALT (SGPT) 12 09/09/2019 11:53 AM     Lab Results   Component Value Date/Time    WBC 7.5 09/09/2019 11:53 AM    HGB 13.2 09/09/2019 11:53 AM    HCT 40.2 09/09/2019 11:53 AM    PLATELET 258 16/68/4780 11:53 AM    MCV 94 09/09/2019 11:53 AM     No results found for: HBA1C, HGBE8, NFP0OHNE, PJU6MLRN, LHS8RVAK  Lab Results   Component Value Date/Time Cholesterol, total 222 (H) 09/09/2019 11:53 AM    HDL Cholesterol 73 09/09/2019 11:53 AM    LDL, calculated 131 (H) 09/09/2019 11:53 AM    VLDL, calculated 18 09/09/2019 11:53 AM    Triglyceride 92 09/09/2019 11:53 AM           Leilani Isidro MD  [facility]  04/22/20 11:25 AM    Portions of this note may have been populated using smart dictation software and may have \"sounds-like\" errors present. Pt was counseled on risks, benefits and alternatives of treatment options. All questions were asked and answered and the patient was agreeable with the treatment plan as outlined.

## 2020-05-22 RX ORDER — HYDROCHLOROTHIAZIDE 25 MG/1
25 TABLET ORAL DAILY
Qty: 90 TAB | Refills: 0 | Status: SHIPPED | OUTPATIENT
Start: 2020-05-22 | End: 2020-08-03 | Stop reason: SDUPTHER

## 2020-07-23 RX ORDER — CITALOPRAM 20 MG/1
20 TABLET, FILM COATED ORAL DAILY
Qty: 90 TAB | Refills: 1 | Status: SHIPPED | OUTPATIENT
Start: 2020-07-23 | End: 2021-01-25

## 2020-07-23 RX ORDER — LOSARTAN POTASSIUM 100 MG/1
100 TABLET ORAL DAILY
Qty: 90 TAB | Refills: 1 | Status: SHIPPED | OUTPATIENT
Start: 2020-07-23 | End: 2021-01-25 | Stop reason: SDUPTHER

## 2020-08-04 RX ORDER — PRAVASTATIN SODIUM 20 MG/1
20 TABLET ORAL
Qty: 90 TAB | Refills: 1 | Status: SHIPPED | OUTPATIENT
Start: 2020-08-04 | End: 2021-02-02

## 2020-08-04 RX ORDER — HYDROCHLOROTHIAZIDE 25 MG/1
25 TABLET ORAL DAILY
Qty: 90 TAB | Refills: 0 | Status: SHIPPED | OUTPATIENT
Start: 2020-08-04 | End: 2020-11-23

## 2020-08-04 RX ORDER — METOPROLOL SUCCINATE 50 MG/1
100 TABLET, EXTENDED RELEASE ORAL DAILY
Qty: 180 TAB | Refills: 1 | Status: SHIPPED | OUTPATIENT
Start: 2020-08-04 | End: 2021-02-16 | Stop reason: SDUPTHER

## 2020-10-26 RX ORDER — ALBUTEROL SULFATE 90 UG/1
2 AEROSOL, METERED RESPIRATORY (INHALATION)
Qty: 1 INHALER | Refills: 1 | Status: SHIPPED | OUTPATIENT
Start: 2020-10-26 | End: 2021-04-01 | Stop reason: SDUPTHER

## 2020-11-23 RX ORDER — HYDROCHLOROTHIAZIDE 25 MG/1
TABLET ORAL
Qty: 90 TAB | Refills: 0 | Status: SHIPPED | OUTPATIENT
Start: 2020-11-23 | End: 2020-12-02 | Stop reason: SDUPTHER

## 2020-11-25 NOTE — TELEPHONE ENCOUNTER
Called pt, and left a voice message, advising she is due to have labs drawn prior to additional medication refills. Advised pt to call office back to discuss.

## 2020-12-01 RX ORDER — HYDROCHLOROTHIAZIDE 25 MG/1
TABLET ORAL
Qty: 90 TAB | Refills: 0 | OUTPATIENT
Start: 2020-12-01

## 2020-12-02 ENCOUNTER — TELEPHONE (OUTPATIENT)
Dept: FAMILY MEDICINE CLINIC | Age: 75
End: 2020-12-02

## 2020-12-02 RX ORDER — HYDROCHLOROTHIAZIDE 25 MG/1
25 TABLET ORAL DAILY
Qty: 90 TAB | Refills: 1 | Status: SHIPPED | OUTPATIENT
Start: 2020-12-02 | End: 2021-02-16 | Stop reason: SDUPTHER

## 2020-12-02 NOTE — TELEPHONE ENCOUNTER
Called pt, and left a voice message, asking that she call the office back in regards to her  medication. ~After pt has been been advised of the need for labs, information will need to be forwarded to Dr. Karin Brooke so lab orders can be placed.

## 2020-12-02 NOTE — TELEPHONE ENCOUNTER
Pt called stating she's out of her hydrochlorothiazide after being advised prescription was approved on 11/23/20 by Dr. Carmella Wick, has gone to pharmacy twice and they don't have it.      Disp  Refills  Start  End     hydroCHLOROthiazide (HYDRODIURIL) 25 mg tablet  90 Tab  0  11/23/2020      Sig: Take 1 tablet by mouth once daily     Sent to pharmacy as: hydroCHLOROthiazide 25 mg tablet (HYDRODIURIL)     E-Prescribing Status: Transmission to pharmacy failed (11/23/2020 12:42 PM EST)

## 2020-12-03 RX ORDER — HYDROCHLOROTHIAZIDE 25 MG/1
TABLET ORAL
Qty: 90 TAB | Refills: 0 | OUTPATIENT
Start: 2020-12-03

## 2020-12-03 NOTE — TELEPHONE ENCOUNTER
Sent again yesterday, but requested after I can see it was confirmed received, pls call [facility], confirm script with them, there may have been some trouble with transmission over the last 2 wks when i've been trying to send it?

## 2020-12-04 ENCOUNTER — TELEPHONE (OUTPATIENT)
Dept: FAMILY MEDICINE CLINIC | Age: 75
End: 2020-12-04

## 2020-12-04 DIAGNOSIS — F41.8 DEPRESSION WITH ANXIETY: ICD-10-CM

## 2020-12-04 DIAGNOSIS — I10 BENIGN ESSENTIAL HYPERTENSION: ICD-10-CM

## 2020-12-04 DIAGNOSIS — E78.5 HYPERLIPIDEMIA, UNSPECIFIED HYPERLIPIDEMIA TYPE: Primary | ICD-10-CM

## 2020-12-04 DIAGNOSIS — K22.719 BARRETT'S ESOPHAGUS WITH DYSPLASIA: ICD-10-CM

## 2020-12-28 DIAGNOSIS — E78.5 HYPERLIPIDEMIA, UNSPECIFIED HYPERLIPIDEMIA TYPE: ICD-10-CM

## 2020-12-28 DIAGNOSIS — I10 BENIGN ESSENTIAL HYPERTENSION: ICD-10-CM

## 2020-12-28 DIAGNOSIS — K22.719 BARRETT'S ESOPHAGUS WITH DYSPLASIA: ICD-10-CM

## 2020-12-28 DIAGNOSIS — F41.8 DEPRESSION WITH ANXIETY: ICD-10-CM

## 2020-12-28 NOTE — TELEPHONE ENCOUNTER
You  Natasha Zavala Just now (9:01 AM)        Good Morning,   Apologizes for that. It appears The message was not sent to Dr. Carmella Wick. I have sent to her and once ordered I will let you know and will fax to lab of your choosing. This [employer] message has not been read. Ish Zavala MD 2 days ago        Hi,   I am till waiting for blood work paper work. My concern is I am running low on a few medication. Thanks for your help. Mao Platting            You  Natasha Zavala 3 weeks ago        Good Morning,   Yes, you will need lab orders. I will ask Dr. Carmella Wick to order your labs and we will mail to your home.    Thank you,   Ish Pinon MD 3 weeks ago        Do I need anything from the  to get a blood test  or other test ?

## 2020-12-28 NOTE — TELEPHONE ENCOUNTER
----- Message from [place]. Sally sent at 12/26/2020  1:22 PM EST -----  Regarding: RE: Non-Urgent Medical Question  Contact: [phone]  Hi,   I am till waiting for blood work paper work. My concern is I am running low on a few medication. Thanks for your help.   Kelton Johnson

## 2020-12-29 NOTE — TELEPHONE ENCOUNTER
Called pt, and left a voice message, advising I was calling he to ask which medication need to be refilled at this time. Advised pt to send to me on RF nanohart or she can call back and let me know.

## 2021-01-01 LAB
ALBUMIN SERPL-MCNC: 4.3 G/DL (ref 3.7–4.7)
ALBUMIN/GLOB SERPL: 1.8 {RATIO} (ref 1.2–2.2)
ALP SERPL-CCNC: 67 IU/L (ref 39–117)
ALT SERPL-CCNC: 21 IU/L (ref 0–32)
AST SERPL-CCNC: 29 IU/L (ref 0–40)
BILIRUB SERPL-MCNC: 0.5 MG/DL (ref 0–1.2)
BUN SERPL-MCNC: 17 MG/DL (ref 8–27)
BUN/CREAT SERPL: 21 (ref 12–28)
CALCIUM SERPL-MCNC: 9.3 MG/DL (ref 8.7–10.3)
CHLORIDE SERPL-SCNC: 98 MMOL/L (ref 96–106)
CHOLEST SERPL-MCNC: 182 MG/DL (ref 100–199)
CO2 SERPL-SCNC: 30 MMOL/L (ref 20–29)
CREAT SERPL-MCNC: 0.81 MG/DL (ref 0.57–1)
ERYTHROCYTE [DISTWIDTH] IN BLOOD BY AUTOMATED COUNT: 12 % (ref 11.7–15.4)
GLOBULIN SER CALC-MCNC: 2.4 G/DL (ref 1.5–4.5)
GLUCOSE SERPL-MCNC: 102 MG/DL (ref 65–99)
HCT VFR BLD AUTO: 38.4 % (ref 34–46.6)
HDLC SERPL-MCNC: 62 MG/DL
HGB BLD-MCNC: 12.9 G/DL (ref 11.1–15.9)
INTERPRETATION, 910389: NORMAL
LDLC SERPL CALC-MCNC: 92 MG/DL (ref 0–99)
MCH RBC QN AUTO: 32.6 PG (ref 26.6–33)
MCHC RBC AUTO-ENTMCNC: 33.6 G/DL (ref 31.5–35.7)
MCV RBC AUTO: 97 FL (ref 79–97)
PLATELET # BLD AUTO: 252 X10E3/UL (ref 150–450)
POTASSIUM SERPL-SCNC: 4.7 MMOL/L (ref 3.5–5.2)
PROT SERPL-MCNC: 6.7 G/DL (ref 6–8.5)
RBC # BLD AUTO: 3.96 X10E6/UL (ref 3.77–5.28)
SODIUM SERPL-SCNC: 143 MMOL/L (ref 134–144)
TRIGL SERPL-MCNC: 166 MG/DL (ref 0–149)
VLDLC SERPL CALC-MCNC: 28 MG/DL (ref 5–40)
WBC # BLD AUTO: 7.6 X10E3/UL (ref 3.4–10.8)

## 2021-01-04 NOTE — PROGRESS NOTES
Normal blood counts  Borderline stable blood sugar (ok) and normal electrolytes, liver and kidney functions  All cholesterol values better EXCEPT the triglycerides just slightly higher than the goal (<150 is goal, yours are 166) you do not \"Get a ticket\" for this, overall labs look good  Eat a healthy diet and exercise as you are able! Keep up the good work! Return call to patient who reports he developed diarrhea and rectal bleeding that began while he was in [place] 1/6/20.  Patient reports quite a bit of bright red blood with each bowel movement and one incident of nighttime incontinence with stool and blood on his sheets.   Patient denies N/V or abdominal cramping or pain.    Patient is on warfarin daily.    Patient is being treated for prostate cancer with Lupron every three months and per patient had XRT one month ago however last XRT in chart was in May, 2019.    Patient is usually good historian however in this call could not recall his cancer type.   Above discussed with LILIANA Nichole who advises patient should be evaluated in the ER.      Patient contacted and made aware and verbalized understanding.

## 2021-01-14 ENCOUNTER — HOSPITAL ENCOUNTER (EMERGENCY)
Age: 76
Discharge: HOME | End: 2021-01-14
Attending: EMERGENCY MEDICINE
Payer: MEDICARE

## 2021-01-14 ENCOUNTER — APPOINTMENT (OUTPATIENT)
Dept: GENERAL RADIOLOGY | Age: 76
End: 2021-01-14
Attending: EMERGENCY MEDICINE
Payer: MEDICARE

## 2021-01-14 VITALS
DIASTOLIC BLOOD PRESSURE: 87 MMHG | SYSTOLIC BLOOD PRESSURE: 145 MMHG | HEART RATE: 94 BPM | RESPIRATION RATE: 15 BRPM | OXYGEN SATURATION: 97 % | HEIGHT: 58 IN | WEIGHT: 180.56 LBS | TEMPERATURE: 97.5 F | BODY MASS INDEX: 37.9 KG/M2

## 2021-01-14 DIAGNOSIS — S22.41XA CLOSED FRACTURE OF MULTIPLE RIBS OF RIGHT SIDE, INITIAL ENCOUNTER: Primary | ICD-10-CM

## 2021-01-14 PROCEDURE — 99282 EMERGENCY DEPT VISIT SF MDM: CPT

## 2021-01-14 PROCEDURE — 71101 X-RAY EXAM UNILAT RIBS/CHEST: CPT

## 2021-01-14 RX ORDER — HYDROCODONE BITARTRATE AND ACETAMINOPHEN 5; 325 MG/1; MG/1
1 TABLET ORAL
Qty: 15 TAB | Refills: 0 | Status: SHIPPED | OUTPATIENT
Start: 2021-01-14 | End: 2021-01-18 | Stop reason: SDUPTHER

## 2021-01-14 NOTE — ED NOTES
The patient was discharged home by Dr Tyrese Johns in stable condition. The patient is alert and oriented, in no respiratory distress. The patient's diagnosis, condition and treatment were explained. The patient expressed understanding. A discharge plan has been developed. A  was not involved in the process. Aftercare instructions were given. Pt ambulatory out of the ED.

## 2021-01-14 NOTE — ED TRIAGE NOTES
On Tuesday pt tripped up a curb and fell on her right breast. She denies LOC. Her right arm, side/back hurts intermittently.  Pt has been taking Advil, took 400mg at 8am

## 2021-01-14 NOTE — ED PROVIDER NOTES
Date of Service:  1/14/2021    Patient:  Angelina Zavala    Chief Complaint:  Rib Pain and Arm Pain       HPI:  Angelina Zavala is a 77 y.o.  female who presents for evaluation of rib pain after fall. Patient had a mechanical fall 2 days ago tripping landing on her right breast and right side. Patient's had some right-sided rib pain present intermittently since the fall. Worse with deep breaths or certain positioning. She denies any type of head neck or back pain. There is no loss of consciousness or prodromal symptoms. No fevers chills. No shortness of breath cough abdominal pain chest pain otherwise. No other acute complaints. Pain is at 11/10. Past Medical History:   Diagnosis Date    Arthritis     Asthma     Calculus of kidney     Congestive heart failure ([facility] [address]     Hypertension        No past surgical history on file. Family History:   Problem Relation Age of Onset    Cancer Mother     Stroke Father     Heart Disease Father        Social History     Socioeconomic History    Marital status: SINGLE     Spouse name: Not on file    Number of children: Not on file    Years of education: Not on file    Highest education level: Not on file   Occupational History    Not on file   Social Needs    Financial resource strain: Not on file    Food insecurity     Worry: Not on file     Inability: Not on file    Transportation needs     Medical: Not on file     Non-medical: Not on file   Tobacco Use    Smoking status: Never Smoker    Smokeless tobacco: Never Used   Substance and Sexual Activity    Alcohol use:  Yes    Drug use: Not on file    Sexual activity: Not on file   Lifestyle    Physical activity     Days per week: Not on file     Minutes per session: Not on file    Stress: Not on file   Relationships    Social connections     Talks on phone: Not on file     Gets together: Not on file     Attends Cheondoism service: Not on file     Active member of club or organization: Not on file     Attends meetings of clubs or organizations: Not on file     Relationship status: Not on file    Intimate partner violence     Fear of current or ex partner: Not on file     Emotionally abused: Not on file     Physically abused: Not on file     Forced sexual activity: Not on file   Other Topics Concern    Not on file   Social History Narrative    Not on file         ALLERGIES: Patient has no known allergies. Review of Systems   Constitutional: Negative for fever. HENT: Negative for hearing loss. Eyes: Negative for visual disturbance. Respiratory: Negative for cough and shortness of breath. Cardiovascular: Positive for chest pain. Gastrointestinal: Negative for abdominal pain. Genitourinary: Negative for flank pain. Musculoskeletal: Negative for back pain. Skin: Positive for color change. Negative for rash. Neurological: Negative for dizziness and light-headedness. Psychiatric/Behavioral: Negative for confusion. Vitals:    01/14/21 1114   BP: (!) 145/87   Pulse: 94   Resp: 15   Temp: 97.5 °F (36.4 °C)   SpO2: 97%   Weight: 81.9 kg (180 lb 8.9 oz)   Height: 4' 10\" (1.473 m)            Physical Exam  Vitals signs and nursing note reviewed. Exam conducted with a chaperone present. Constitutional:       Appearance: Normal appearance. HENT:      Head: Normocephalic and atraumatic. Nose: Nose normal. No congestion. Mouth/Throat:      Mouth: Mucous membranes are moist.      Pharynx: No oropharyngeal exudate. Eyes:      General: No scleral icterus. Cardiovascular:      Rate and Rhythm: Normal rate. Pulses: Normal pulses. Heart sounds: No murmur. Pulmonary:      Effort: Pulmonary effort is normal. No respiratory distress. Breath sounds: No stridor. No wheezing, rhonchi or rales. Abdominal:      General: Abdomen is flat. Tenderness: There is no abdominal tenderness. Musculoskeletal: Normal range of motion.       Comments: Tenderness over the right rib cage, lateral   Skin:     General: Skin is warm. Capillary Refill: Capillary refill takes less than 2 seconds. Comments: Bruising to medial right breast, non tender   Neurological:      Mental Status: She is alert and oriented to person, place, and time. Psychiatric:         Mood and Affect: Mood normal.          [facility]     VITAL SIGNS:  Patient Vitals for the past 4 hrs:   Temp Pulse Resp BP SpO2   01/14/21 1114 97.5 °F (36.4 °C) 94 15 (!) 145/87 97 %         LABS:  No results found for this or any previous visit (from the past 6 hour(s)). IMAGING:  XR RIBS RT W PA CXR MIN 3 V   Final Result   IMPRESSION:  Right rib fractures. Medications During Visit:  Medications - No data to display      DECISION MAKING:  Ammon Zavala is a 77 y.o. female who comes in as above. Diagnostics as above. I will provide incentive spirometer and medication for pain. Patient to follow with her primary care doctor. Return as needed. Patient agreeable to plan. We have also discussed over-the-counter pain patches      IMPRESSION:  1. Closed fracture of multiple ribs of right side, initial encounter        DISPOSITION:  Discharged      Current Discharge Medication List      START taking these medications    Details   HYDROcodone-acetaminophen (Norco) 5-325 mg per tablet Take 1 Tab by mouth every four (4) hours as needed for Pain for up to 3 days. Max Daily Amount: 6 Tabs. Qty: 15 Tab, Refills: 0    Associated Diagnoses: Closed fracture of multiple ribs of right side, initial encounter              Follow-up Information     Follow up With Specialties Details Why Contact Info    Joshua Renteria MD Family Medicine Schedule an appointment as soon as possible for a visit   [address] [place] [address]  [phone]              The patient is asked to follow-up with their primary care provider in the next several days. They are to call tomorrow for an appointment. The patient is asked to return promptly for any increased concerns or worsening of symptoms. They can return to this emergency department or any other emergency department.     Procedures

## 2021-01-18 ENCOUNTER — VIRTUAL VISIT (OUTPATIENT)
Dept: FAMILY MEDICINE CLINIC | Age: 76
End: 2021-01-18
Payer: MEDICARE

## 2021-01-18 DIAGNOSIS — S22.41XA CLOSED FRACTURE OF MULTIPLE RIBS OF RIGHT SIDE, INITIAL ENCOUNTER: ICD-10-CM

## 2021-01-18 PROCEDURE — 1090F PRES/ABSN URINE INCON ASSESS: CPT | Performed by: FAMILY MEDICINE

## 2021-01-18 PROCEDURE — G8510 SCR DEP NEG, NO PLAN REQD: HCPCS | Performed by: FAMILY MEDICINE

## 2021-01-18 PROCEDURE — G8427 DOCREV CUR MEDS BY ELIG CLIN: HCPCS | Performed by: FAMILY MEDICINE

## 2021-01-18 PROCEDURE — 3017F COLORECTAL CA SCREEN DOC REV: CPT | Performed by: FAMILY MEDICINE

## 2021-01-18 PROCEDURE — G8400 PT W/DXA NO RESULTS DOC: HCPCS | Performed by: FAMILY MEDICINE

## 2021-01-18 PROCEDURE — 99213 OFFICE O/P EST LOW 20 MIN: CPT | Performed by: FAMILY MEDICINE

## 2021-01-18 PROCEDURE — G8756 NO BP MEASURE DOC: HCPCS | Performed by: FAMILY MEDICINE

## 2021-01-18 PROCEDURE — G9899 SCRN MAM PERF RSLTS DOC: HCPCS | Performed by: FAMILY MEDICINE

## 2021-01-18 PROCEDURE — 1101F PT FALLS ASSESS-DOCD LE1/YR: CPT | Performed by: FAMILY MEDICINE

## 2021-01-18 RX ORDER — HYDROCODONE BITARTRATE AND ACETAMINOPHEN 5; 325 MG/1; MG/1
1 TABLET ORAL
Qty: 28 TAB | Refills: 0 | Status: SHIPPED | OUTPATIENT
Start: 2021-01-18 | End: 2021-01-25

## 2021-01-18 RX ORDER — LIDOCAINE 50 MG/G
PATCH TOPICAL
Qty: 15 EACH | Refills: 0 | Status: SHIPPED | OUTPATIENT
Start: 2021-01-18 | End: 2021-08-19

## 2021-01-18 NOTE — PROGRESS NOTES
Rhys Klinefelter Dermer is a 77 y.o. female who was seen by synchronous (real-time) audio-video technology on 1/18/2021. Consent: Rhys Klinefelter Dermer, who was seen by synchronous (real-time) audio-video technology, and/or her healthcare decision maker, is aware that this patient-initiated, Telehealth encounter on 1/18/2021 is a billable service, with coverage as determined by her insurance carrier. She is aware that she may receive a bill and has provided verbal consent to proceed: Yes. Assessment & Plan:   1. Closed fracture of multiple ribs of right side, initial encounter  ATC advil 600  Norco PRN  Lido patch  Supportive care  Precautions for care reivewed   appropriate    - HYDROcodone-acetaminophen (Norco) 5-325 mg per tablet; Take 1 Tab by mouth every six (6) hours as needed for Pain for up to 7 days. Max Daily Amount: 4 Tabs. Dispense: 28 Tab; Refill: 0  - lidocaine (LIDODERM) 5 %; Apply patch to the affected area for 12 hours a day and remove for 12 hours a day. Dispense: 15 Each; Refill: 0          Pt was counseled on risks, benefits and alternatives of treatment options. All questions were asked and answered and the patient was agreeable with the treatment plan as outlined.     Subjective:   Rhys Klinefelter Dermer is a 77 y.o. female who was seen for Fall (01/12/2021) and Rib Injury (7th and 8th )      The patient went down to her storage unit and she reports that there was a little incline, she was wearing slippers, her shoe caught the ground and she went down on her right side  She reports it took her breathe away, she was able to get up, go home and get to the er  R lateral 6th and 7th rib fractures, no pneumo, pleural effusion  Pain today--depends on activity, sometimes it is a 10, sometimes it hovers around an 8    Taking norco when really needed  Taking advil taking 600 mg when she remembers, not more than 4 h   Medications, allergies, PMH, PSH, SOCH, [facility] reviewed and updated per routine protocol, see chart for review and changes if not noted here. ROS  A 12 point review of systems was negative except as noted here or in the HPI.     Objective:   Vital Signs: (As obtained by patient/caregiver at home)  Patient-Reported Vitals 1/18/2021   Patient-Reported Weight 180lb   Patient-Reported Temperature 97.5   Patient-Reported SpO2 98   Patient-Reported Systolic  081   Patient-Reported Diastolic 81        [INSTRUCTIONS:  \"[x]\" Indicates a positive item  \"[]\" Indicates a negative item  -- DELETE ALL ITEMS NOT EXAMINED]    Constitutional: [x] Appears well-developed and well-nourished [x] No apparent distress      [] Abnormal -     Mental status: [x] Alert and awake  [x] Oriented to person/place/time [x] Able to follow commands    [] Abnormal -     Eyes:   EOM    [x]  Normal    [] Abnormal -   Sclera  [x]  Normal    [] Abnormal -          Discharge [x]  None visible   [] Abnormal -     HENT: [x] Normocephalic, atraumatic  [] Abnormal -   [x] Mouth/Throat: Mucous membranes are moist    External Ears [x] Normal  [] Abnormal -    Neck: [x] No visualized mass [] Abnormal -     Pulmonary/Chest: [x] Respiratory effort normal   [x] No visualized signs of difficulty breathing or respiratory distress        [] Abnormal -      Musculoskeletal:   [x] Normal gait with no signs of ataxia         [x] Normal range of motion of neck        [] Abnormal -     Neurological:        [x] No Facial Asymmetry (Cranial nerve 7 motor function) (limited exam due to video visit)          [x] No gaze palsy        [] Abnormal -          Skin:        [x] No significant exanthematous lesions or discoloration noted on facial skin         [] Abnormal -            Psychiatric:       [x] Normal Affect [] Abnormal -        [x] No Hallucinations    Other pertinent observable physical exam findings:pain with cough/laugh notable otherwise no acute distress, non toxic and well appearing    We discussed the expected course, resolution and complications of the diagnosis(es) in detail. Medication risks, benefits, costs, interactions, and alternatives were discussed as indicated. I advised her to contact the office if her condition worsens, changes or fails to improve as anticipated. She expressed understanding with the diagnosis(es) and plan. Karin Zavala is a 77 y.o. female who was evaluated by a video visit encounter for concerns as above. Patient identification was verified prior to start of the visit. A caregiver was present when appropriate. Due to this being a TeleHealth encounter (During FAIYH-45 public health emergency), evaluation of the following organ systems was limited: Vitals/Constitutional/EENT/Resp/CV/GI//MS/Neuro/Skin/Heme-Lymph-Imm. Pursuant to the emergency declaration under the [address] [place] [address], [facility] waiver authority and the [employer] Act, this Virtual  Visit was conducted, with patient's (and/or legal guardian's) consent, to reduce the patient's risk of exposure to COVID-19 and provide necessary medical care. Services were provided through a video synchronous discussion virtually to substitute for in-person clinic visit. Patient and provider were located at their individual homes. Antoine Enamorado MD  [facility]  01/18/21 10:16 AM     Portions of this note may have been populated using smart dictation software and may have \"sounds-like\" errors present.

## 2021-01-18 NOTE — PROGRESS NOTES
Chief Complaint   Patient presents with   Rex Nunes     01/12/2021    Rib Injury     7th and 8th      1. Have you been to the ER, urgent care clinic since your last visit? Hospitalized since your last visit? Yes, 01/14/2021 [address] [place], [place] [address]Wilmaron Jacinto on 01/12/2021 broke 2 ribs. 2. Have you seen or consulted any other health care providers outside of the [address] [place] [address] since your last visit? Include any pap smears or colon screening.  No

## 2021-01-25 RX ORDER — CITALOPRAM 20 MG/1
TABLET, FILM COATED ORAL
Qty: 90 TAB | Refills: 0 | Status: SHIPPED | OUTPATIENT
Start: 2021-01-25 | End: 2021-05-03 | Stop reason: SDUPTHER

## 2021-01-27 RX ORDER — LOSARTAN POTASSIUM 100 MG/1
100 TABLET ORAL DAILY
Qty: 90 TAB | Refills: 1 | Status: SHIPPED | OUTPATIENT
Start: 2021-01-27 | End: 2021-02-02 | Stop reason: SDUPTHER

## 2021-01-27 RX ORDER — CITALOPRAM 20 MG/1
20 TABLET, FILM COATED ORAL DAILY
Qty: 90 TAB | Refills: 1 | Status: SHIPPED | OUTPATIENT
Start: 2021-01-27 | End: 2021-05-03 | Stop reason: SDUPTHER

## 2021-02-02 ENCOUNTER — PATIENT MESSAGE (OUTPATIENT)
Dept: FAMILY MEDICINE CLINIC | Age: 76
End: 2021-02-02

## 2021-02-02 DIAGNOSIS — Z84.81 FAMILY HISTORY OF BRCA2 GENE POSITIVE: ICD-10-CM

## 2021-02-02 DIAGNOSIS — Z91.89 AT HIGH RISK FOR BREAST CANCER: Primary | ICD-10-CM

## 2021-02-02 RX ORDER — PRAVASTATIN SODIUM 20 MG/1
TABLET ORAL
Qty: 90 TAB | Refills: 0 | Status: SHIPPED | OUTPATIENT
Start: 2021-02-02 | End: 2021-05-03 | Stop reason: SDUPTHER

## 2021-02-02 RX ORDER — LOSARTAN POTASSIUM 100 MG/1
100 TABLET ORAL DAILY
Qty: 90 TAB | Refills: 1 | Status: SHIPPED | OUTPATIENT
Start: 2021-02-02 | End: 2021-05-03 | Stop reason: SDUPTHER

## 2021-02-02 NOTE — TELEPHONE ENCOUNTER
From: Sierra Zavala  To: Meghan Escoto MD  Sent: 2/2/2021 11:32 AM EST  Subject: Update Medical Information    I just found out my sister and her daughter (my niece) have tested positive for the BRCA gene. It is BRCA 2 positive. I would like this updated to my medical history please. Thank you.  Wilton Kapadia

## 2021-02-17 RX ORDER — METOPROLOL SUCCINATE 50 MG/1
100 TABLET, EXTENDED RELEASE ORAL DAILY
Qty: 180 TAB | Refills: 1 | Status: SHIPPED | OUTPATIENT
Start: 2021-02-17 | End: 2021-05-03 | Stop reason: SDUPTHER

## 2021-02-17 RX ORDER — HYDROCHLOROTHIAZIDE 25 MG/1
25 TABLET ORAL DAILY
Qty: 90 TAB | Refills: 1 | Status: SHIPPED | OUTPATIENT
Start: 2021-02-17 | End: 2021-05-03 | Stop reason: SDUPTHER

## 2021-02-23 ENCOUNTER — HOSPITAL ENCOUNTER (OUTPATIENT)
Dept: MAMMOGRAPHY | Age: 76
Discharge: HOME | End: 2021-02-23
Attending: FAMILY MEDICINE
Payer: MEDICARE

## 2021-02-23 DIAGNOSIS — Z12.39 BREAST CANCER SCREENING: ICD-10-CM

## 2021-02-23 DIAGNOSIS — Z12.31 ENCOUNTER FOR SCREENING MAMMOGRAM FOR MALIGNANT NEOPLASM OF BREAST: ICD-10-CM

## 2021-02-23 PROCEDURE — 77067 SCR MAMMO BI INCL CAD: CPT

## 2021-04-01 RX ORDER — ALBUTEROL SULFATE 90 UG/1
2 AEROSOL, METERED RESPIRATORY (INHALATION)
Qty: 1 INHALER | Refills: 1 | Status: SHIPPED | OUTPATIENT
Start: 2021-04-01 | End: 2021-08-19 | Stop reason: SDUPTHER

## 2021-05-03 ENCOUNTER — VIRTUAL VISIT (OUTPATIENT)
Dept: FAMILY MEDICINE CLINIC | Age: 76
End: 2021-05-03
Payer: MEDICARE

## 2021-05-03 DIAGNOSIS — I34.0 NON-RHEUMATIC MITRAL REGURGITATION: ICD-10-CM

## 2021-05-03 DIAGNOSIS — Z13.39 SCREENING FOR ALCOHOLISM: ICD-10-CM

## 2021-05-03 DIAGNOSIS — F41.8 DEPRESSION WITH ANXIETY: ICD-10-CM

## 2021-05-03 DIAGNOSIS — I49.9 CARDIAC ARRHYTHMIA, UNSPECIFIED CARDIAC ARRHYTHMIA TYPE: ICD-10-CM

## 2021-05-03 DIAGNOSIS — Z71.89 ADVANCED DIRECTIVES, COUNSELING/DISCUSSION: ICD-10-CM

## 2021-05-03 DIAGNOSIS — Z00.00 MEDICARE ANNUAL WELLNESS VISIT, SUBSEQUENT: Primary | ICD-10-CM

## 2021-05-03 DIAGNOSIS — Z78.0 ASYMPTOMATIC MENOPAUSE: ICD-10-CM

## 2021-05-03 DIAGNOSIS — I10 BENIGN ESSENTIAL HYPERTENSION: ICD-10-CM

## 2021-05-03 DIAGNOSIS — E66.01 SEVERE OBESITY (HCC): ICD-10-CM

## 2021-05-03 DIAGNOSIS — Z71.89 ADVANCED CARE PLANNING/COUNSELING DISCUSSION: ICD-10-CM

## 2021-05-03 DIAGNOSIS — Z13.39 SCREENING FOR ALCOHOL PROBLEM: ICD-10-CM

## 2021-05-03 DIAGNOSIS — I35.1 NONRHEUMATIC AORTIC (VALVE) INSUFFICIENCY: ICD-10-CM

## 2021-05-03 DIAGNOSIS — E78.5 HYPERLIPIDEMIA, UNSPECIFIED HYPERLIPIDEMIA TYPE: ICD-10-CM

## 2021-05-03 DIAGNOSIS — F10.90 HABITUAL ALCOHOL USE: ICD-10-CM

## 2021-05-03 PROCEDURE — G0439 PPPS, SUBSEQ VISIT: HCPCS | Performed by: FAMILY MEDICINE

## 2021-05-03 PROCEDURE — 1090F PRES/ABSN URINE INCON ASSESS: CPT | Performed by: FAMILY MEDICINE

## 2021-05-03 PROCEDURE — G8400 PT W/DXA NO RESULTS DOC: HCPCS | Performed by: FAMILY MEDICINE

## 2021-05-03 PROCEDURE — 1101F PT FALLS ASSESS-DOCD LE1/YR: CPT | Performed by: FAMILY MEDICINE

## 2021-05-03 PROCEDURE — G8417 CALC BMI ABV UP PARAM F/U: HCPCS | Performed by: FAMILY MEDICINE

## 2021-05-03 PROCEDURE — 99214 OFFICE O/P EST MOD 30 MIN: CPT | Performed by: FAMILY MEDICINE

## 2021-05-03 PROCEDURE — G8510 SCR DEP NEG, NO PLAN REQD: HCPCS | Performed by: FAMILY MEDICINE

## 2021-05-03 PROCEDURE — G8756 NO BP MEASURE DOC: HCPCS | Performed by: FAMILY MEDICINE

## 2021-05-03 PROCEDURE — 3017F COLORECTAL CA SCREEN DOC REV: CPT | Performed by: FAMILY MEDICINE

## 2021-05-03 PROCEDURE — G8427 DOCREV CUR MEDS BY ELIG CLIN: HCPCS | Performed by: FAMILY MEDICINE

## 2021-05-03 PROCEDURE — G8536 NO DOC ELDER MAL SCRN: HCPCS | Performed by: FAMILY MEDICINE

## 2021-05-03 RX ORDER — PRAVASTATIN SODIUM 20 MG/1
TABLET ORAL
Qty: 90 TAB | Refills: 3 | Status: SHIPPED | OUTPATIENT
Start: 2021-05-03 | End: 2022-04-11 | Stop reason: SDUPTHER

## 2021-05-03 RX ORDER — METOPROLOL SUCCINATE 50 MG/1
100 TABLET, EXTENDED RELEASE ORAL DAILY
Qty: 180 TAB | Refills: 1 | Status: SHIPPED | OUTPATIENT
Start: 2021-05-03 | End: 2021-11-06 | Stop reason: SDUPTHER

## 2021-05-03 RX ORDER — CITALOPRAM 20 MG/1
20 TABLET, FILM COATED ORAL DAILY
Qty: 90 TAB | Refills: 3 | Status: SHIPPED | OUTPATIENT
Start: 2021-05-03 | End: 2022-04-11 | Stop reason: SDUPTHER

## 2021-05-03 RX ORDER — HYDROCHLOROTHIAZIDE 25 MG/1
25 TABLET ORAL DAILY
Qty: 90 TAB | Refills: 1 | Status: SHIPPED | OUTPATIENT
Start: 2021-05-03 | End: 2021-11-06 | Stop reason: SDUPTHER

## 2021-05-03 RX ORDER — LOSARTAN POTASSIUM 100 MG/1
100 TABLET ORAL DAILY
Qty: 90 TAB | Refills: 1 | Status: SHIPPED | OUTPATIENT
Start: 2021-05-03 | End: 2022-01-19 | Stop reason: SDUPTHER

## 2021-05-03 NOTE — ACP (ADVANCE CARE PLANNING)
Advance Care Planning     Advance Care Planning (ACP) Physician/NP/PA Conversation      Date of Conversation: 5/3/2021  Conducted with: Patient with [address] Decision Maker:     Primary Decision Maker: Carlene Dennis - Donna - [phone]    Primary Decision Maker: Chela Esteban - Daughter  Click here to complete [address] including selection of the Healthcare Decision Maker Relationship (ie \"Primary\")  Today we documented Decision Maker(s) consistent with Legal Next of Kin hierarchy. Care Preferences:    Hospitalization: \"If your health worsens and it becomes clear that your chance of recovery is unlikely, what would be your preference regarding hospitalization? \"  The patient is unsure. Ventilation: \"If you were unable to breathe on your own and your chance of recovery was unlikely, what would be your preference about the use of a ventilator (breathing machine) if it was available to you? \"   The patient would NOT desire the use of a ventilator. Resuscitation: \"In the event your heart stopped as a result of an underlying serious health condition, would you want attempts to be made to restart your heart, or would you prefer a natural death? \"   No, do NOT attempt to resuscitate.     Additional topics discussed: treatment goals, benefit/burden of treatment options, ventilation preferences, hospitalization preferences, resuscitation preferences, end of life care preferences (vegetative state/imminent death) and hospice care    Conversation Outcomes / Follow-Up Plan:   ACP incomplete - refer to ACP Clinical Specialist  Reviewed DNR/DNI and patient elects DNR order - referred to [facility] Specialist & placed order     Length of Voluntary ACP Conversation in minutes:  16 minutes    Iveth Westbrook MD

## 2021-05-03 NOTE — PROGRESS NOTES
Chief Complaint   Patient presents with   [facility] Annual Wellness Visit     1. Have you been to the ER, urgent care clinic since your last visit? Hospitalized since your last visit? No    2. Have you seen or consulted any other health care providers outside of the [address] [place] [address] since your last visit? Include any pap smears or colon screening.  No

## 2021-05-03 NOTE — PROGRESS NOTES
This is the Subsequent Medicare Annual Wellness Exam, performed 12 months or more after the Initial AWV or the last Subsequent AWV    I have reviewed the patient's medical history in detail and updated the computerized patient record. Assessment/Plan   Education and counseling provided:  Are appropriate based on today's review and evaluation    1.  Medicare annual wellness visit, subsequent       Depression Risk Factor Screening     3 most recent PHQ Screens 5/3/2021   PHQ Not Done -   Little interest or pleasure in doing things Not at all   Feeling down, depressed, irritable, or hopeless Not at all   Total Score PHQ 2 0   Trouble falling or staying asleep, or sleeping too much -   Feeling tired or having little energy -   Poor appetite, weight loss, or overeating -   Feeling bad about yourself - or that you are a failure or have let yourself or your family down -   Trouble concentrating on things such as school, work, reading, or watching TV -   Moving or speaking so slowly that other people could have noticed; or the opposite being so fidgety that others notice -   Thoughts of being better off dead, or hurting yourself in some way -   PHQ 9 Score -   How difficult have these problems made it for you to do your work, take care of your home and get along with others -       Alcohol Risk Screen    Do you average more than 1 drink per night or more than 7 drinks a week:  Yes    On any one occasion in the past three months have you have had more than 3 drinks containing alcohol:  no  AUDIT Screen Score: AUDIT Score: 13    Document Brief Intervention (corresponds directly with the 5 A's, Ask, Advise, Assess, Assist, and Arrange):  Discussed with patient, previously was doing worse, feels like she has this stable for now, under control for now, previously she drank more alcohol, she has cut back about a year ago to at most 7 drinks per week    At- Risk Patients (Score 7-15 for women; 8-15 for men)  Discuss concern patient is drinking at unhealthy levels known to increase risk of alcohol-related health problems. Is Patient ready to commit to change? Yes and she has already cut back    If No:   Encourage reflection   Discuss short term and long term health risks of consuming alcohol   Barriers to change   Reaffirm willingness to help / Educational materials provided  If Yes:   Set goal  Eachpal provided    Harmful use or Dependence (Score 16 or greater)   Discuss short term and long term health risks of consuming alcohol   Recommendations   Negotiate drinking goal   Recommend addiction specialist/center   Arrange follow-up appointments. Functional Ability and Level of Safety    Hearing: Hearing is good. Activities of Daily Living: The home contains: handrails  ADL Assessment 5/3/2021   Feeding yourself No Help Needed   Getting from bed to chair No Help Needed   Getting dressed No Help Needed   Bathing or showering No Help Needed   Walk across the room (includes cane/walker) No Help Needed   Using the telphone No Help Needed   Taking your medications No Help Needed   Preparing meals No Help Needed   Managing money (expenses/bills) No Help Needed   Moderately strenuous housework (laundry) No Help Needed   Shopping for personal items (toiletries/medicines) No Help Needed   Shopping for groceries No Help Needed   Driving No Help Needed   Climbing a flight of stairs No Help Needed   Getting to places beyond walking distances No Help Needed        Ambulation: with mild difficulty     Fall Risk:  Fall Risk Assessment, last 12 mths 5/3/2021   Able to walk? Yes   Fall in past 12 months? 1   Do you feel unsteady? 0   Are you worried about falling 1   Number of falls in past 12 months 2   Fall with injury?  1      Abuse Screen:  Patient is not abused       Cognitive Screening    Has your family/caregiver stated any concerns about your memory: no     Cognitive Screening: Normal - Verbal Fluency Test    Health Maintenance Due     Health Maintenance Due   Topic Date Due    DTaP/Tdap/Td series (1 - Tdap) Never done    Bone Densitometry (Dexa) Screening  Never done    Pneumococcal 65+ years (1 of 1 - PPSV23) Never done       Patient Care Team   Patient Care Team:  Renzo Fox MD as PCP - General (Family Medicine)  Renzo Fox MD as PCP - [facility] Empaneled Provider    History     Patient Active Problem List   Diagnosis Code    Severe obesity ([facility] [address] E66.01    Benign essential hypertension I10    Cat's esophagus K22.70    Diverticular disease of colon K57.30    Hyperlipidemia E78.5    Family history of BRCA2 gene positive Z84.81     Past Medical History:   Diagnosis Date    Arthritis     Asthma     Calculus of kidney     Congestive heart failure ([facility] [address]     Hypertension       History reviewed. No pertinent surgical history. Current Outpatient Medications   Medication Sig Dispense Refill    albuterol (ProAir HFA) 90 mcg/actuation inhaler Take 2 Puffs by inhalation every six (6) hours as needed for Wheezing. 1 Inhaler 1    metoprolol succinate (TOPROL-XL) 50 mg XL tablet Take 2 Tabs by mouth daily. 180 Tab 1    hydroCHLOROthiazide (HYDRODIURIL) 25 mg tablet Take 1 Tab by mouth daily. 90 Tab 1    pravastatin (PRAVACHOL) 20 mg tablet Take 1 tablet by mouth nightly 90 Tab 0    losartan (COZAAR) 100 mg tablet Take 1 Tab by mouth daily. 90 Tab 1    citalopram (CELEXA) 20 mg tablet Take 1 Tab by mouth daily. 90 Tab 1    citalopram (CELEXA) 20 mg tablet Take 1 tablet by mouth once daily 90 Tab 0    vitamin e (E GEMS) 100 unit capsule Take  by mouth daily.  cholecalciferol, vitamin D3, (VITAMIN D3 PO) Take  by mouth.  lidocaine (LIDODERM) 5 % Apply patch to the affected area for 12 hours a day and remove for 12 hours a day. 15 Each 0    fluticasone propionate (FLONASE ALLERGY RELIEF) 50 mcg/actuation nasal spray 2 Sprays by Both Nostrils route daily.        No Known Allergies    Family History   Problem Relation Age of Onset    Cancer Mother     Breast Cancer Mother     Stroke Father     Heart Disease Father     Breast Cancer Niece     Uterine Cancer Sister      Social History     Tobacco Use    Smoking status: Never Smoker    Smokeless tobacco: Never Used   Substance Use Topics    Alcohol use:  Yes         Ghada Dos Santos LPN on behalf of  Ivon Long MD  [facility]  05/03/21 11:38 AM

## 2021-05-03 NOTE — PROGRESS NOTES
Gilbert Rodriguez is a 77 y.o. female who was seen by synchronous (real-time) audio-video technology on 5/3/2021. Consent: Aryalucasakila Zavala, who was seen by synchronous (real-time) audio-video technology, and/or her healthcare decision maker, is aware that this patient-initiated, Telehealth encounter on 5/3/2021 is a billable service, with coverage as determined by her insurance carrier. She is aware that she may receive a bill and has provided verbal consent to proceed: Yes. Assessment & Plan:   1. Medicare annual wellness visit, subsequent  See note  - [place] ANNUAL ALCOHOL SCREEN 15 MIN    2. Screening for alcohol problem  POSITIVE SCREENING< reviewed audit score, patient already cut back, at reasonable content / wk, will continue to support her  - NM ANNUAL ALCOHOL SCREEN 15 MIN    3. Severe obesity ([address]  Encourage on maintaining healthy weight/ bmi is 37.7    4. Benign essential hypertension  bp unknown, will need recheck  - hydroCHLOROthiazide (HYDRODIURIL) 25 mg tablet; Take 1 Tab by mouth daily. Dispense: 90 Tab; Refill: 1  - losartan (COZAAR) 100 mg tablet; Take 1 Tab by mouth daily. Dispense: 90 Tab; Refill: 1  - metoprolol succinate (TOPROL-XL) 50 mg XL tablet; Take 2 Tabs by mouth daily. Dispense: 180 Tab; Refill: 1  - REFERRAL TO CARDIOLOGY    5. Hyperlipidemia, unspecified hyperlipidemia type  C/w statin, labs utd, cardiology referral  - pravastatin (PRAVACHOL) 20 mg tablet; Take 1 tablet by mouth nightly  Dispense: 90 Tab; Refill: 3  - REFERRAL TO CARDIOLOGY    6. Depression with anxiety  C/w celexa it is helping  - citalopram (CELEXA) 20 mg tablet; Take 1 Tab by mouth daily. Dispense: 90 Tab; Refill: 3    7. Advanced care planning/counseling discussion  See note, referral placed  - REFERRAL TO ACP CLINICAL SPECIALIST    8. Asymptomatic menopause  dexa recommended  - DEXA BONE DENSITY STUDY AXIAL; Future    9.  Cardiac arrhythmia, unspecified cardiac arrhythmia type  As above  - REFERRAL TO CARDIOLOGY    10. Habitual alcohol use  As above  - [place] ANNUAL ALCOHOL SCREEN 15 MIN    11. Nonrheumatic aortic (valve) insufficiency  As above  - REFERRAL TO CARDIOLOGY    12. Non-rheumatic mitral regurgitation  As above  - REFERRAL TO CARDIOLOGY    13. Advanced directives, counseling/discussion  As above  - ADVANCE CARE PLANNING FIRST 27 MINS    14. Screening for alcoholism  As above  - [place] ANNUAL ALCOHOL SCREEN 15 MIN          Pt was counseled on risks, benefits and alternatives of treatment options. All questions were asked and answered and the patient was agreeable with the treatment plan as outlined. Encounter time today was 40+ minutes and more than 50% of this encounter was spent in counseling face-to-face regarding Diagnosis, Patient Education, Medication Management, Compliance and Impressions. Time documented includes face to face time, documentation and chart review if applicable except as noted. Subjective:   Stephanie Zavala is a 77 y.o. female who was seen for Annual Wellness Visit      HLD: needing refill, last labs reviewed with patient    MOOD: has cut back on drinking, using celexa 20 mg / day and she is doing a good job on this she reports. She has a lot of grief from surviving her     Still having some difficulty with l hand but has had that worked up and there doesn't seem to be any way to improve that       Medications, allergies, PMH, PSH, SOCH, [facility] reviewed and updated per routine protocol, see chart for review and changes if not noted here. ROS  A 12 point review of systems was negative except as noted here or in the HPI.     Objective:   Vital Signs: (As obtained by patient/caregiver at home)  Patient-Reported Vitals 5/3/2021   Patient-Reported Weight 173lb   Patient-Reported Pulse 90   Patient-Reported Temperature 97.7   Patient-Reported SpO2 98   Patient-Reported Systolic  -   Patient-Reported Diastolic -          We discussed the expected course, resolution and complications of the diagnosis(es) in detail. Medication risks, benefits, costs, interactions, and alternatives were discussed as indicated. I advised her to contact the office if her condition worsens, changes or fails to improve as anticipated. She expressed understanding with the diagnosis(es) and plan. Jenna Zavala is a 77 y.o. female who was evaluated by a video visit encounter for concerns as above. Patient identification was verified prior to start of the visit. A caregiver was present when appropriate. Due to this being a TeleHealth encounter (During BXA-24 public health emergency), evaluation of the following organ systems was limited: Vitals/Constitutional/EENT/Resp/CV/GI//MS/Neuro/Skin/Heme-Lymph-Imm. Pursuant to the emergency declaration under the [facility], [facility] waiver authority and the [employer] Act, this Virtual  Visit was conducted, with patient's (and/or legal guardian's) consent, to reduce the patient's risk of exposure to COVID-19 and provide necessary medical care. Services were provided through a video synchronous discussion virtually to substitute for in-person clinic visit. Patient and provider were located at their individual homes. MD Renaldo Phipps [facility]  05/03/21 11:45 AM     Portions of this note may have been populated using smart dictation software and may have \"sounds-like\" errors present.

## 2021-05-03 NOTE — PATIENT INSTRUCTIONS

## 2021-05-04 ENCOUNTER — PATIENT OUTREACH (OUTPATIENT)
Dept: CASE MANAGEMENT | Age: 76
End: 2021-05-04

## 2021-05-04 NOTE — ACP (ADVANCE CARE PLANNING)
Advance Care Planning   Ambulatory ACP Specialist Patient Outreach    Date:  5/4/2021    ACP Specialist:  Joycelyn Rabago LPN    Outreach call to patient in follow-up to ACP Specialist referral from:    [x] PCP  [] Provider   [] Ambulatory Care Management [] Other     For:                  [] Continued Conversation for ACP decision making / Goals of Care             [] Code Status Discussion             [x] Completion of Adv Directive             [] Completion of Portable DNR order             [] Other (Specify)    Date Referral Received:5/3/2021    Today's Outreach:  [x] First   [] Second  [] Third                                           Third outreach made by [x]  phone  [] email []   [employer]     Intervention:  [] Spoke with Patient   [x] Left VM requesting return call      Outcome: VM left, will follow up again within one week. Next Step:   [] ACP scheduled conversation  [x] Outreach again in one week               [] Email / Mail ACP Info Sheets  [] Email / Mail Advance Directive   []  Closing referral.  Routing closure to referring provider/staff and to ACP Specialist .      Thank you for this referral.

## 2021-05-18 ENCOUNTER — PATIENT OUTREACH (OUTPATIENT)
Dept: CASE MANAGEMENT | Age: 76
End: 2021-05-18

## 2021-05-18 NOTE — ACP (ADVANCE CARE PLANNING)
Advance Care Planning   Ambulatory ACP Specialist Patient Outreach    Date:  5/18/2021    ACP Specialist:  Penny Bagley LPN    Outreach call to patient in follow-up to ACP Specialist referral from:    [x] PCP  [] Provider   [] Ambulatory Care Management [] Other     For:                  [] Continued Conversation for ACP decision making / Goals of Care             [] Code Status Discussion             [x] Completion of Adv Directive             [] Completion of Portable DNR order             [] Other (Specify)    Date Referral Received: 5/3/21     Today's Outreach:  [] First   [x] Second  [] Third                                           Third outreach made by [x]  phone  [] email []   [employer]     Intervention:  [] Spoke with Patient   [x] Left VM requesting return call      Outcome:Second attempt to contact pateint regarding ACP. No answer on mobile phone. Voicemail left for return call. Will make third attempt within one week. Next Step:   [] ACP scheduled conversation  [x] Outreach again in one week               [] Email / Mail ACP Info Sheets  [] Email / Mail Advance Directive   []  Closing referral.  Routing closure to referring provider/staff and to ACP Specialist .      Thank you for this referral.

## 2021-05-24 ENCOUNTER — PATIENT OUTREACH (OUTPATIENT)
Dept: CASE MANAGEMENT | Age: 76
End: 2021-05-24

## 2021-05-24 NOTE — ACP (ADVANCE CARE PLANNING)
Advance Care Planning   Ambulatory ACP Specialist Patient Outreach    Date:  5/24/2021    ACP Specialist:  Brendan Guzman LPN    Outreach call to patient in follow-up to ACP Specialist referral from:    [x] PCP  [] Provider   [] Ambulatory Care Management [] Other     For:                  [] Continued Conversation for ACP decision making / Goals of Care             [] Code Status Discussion             [x] Completion of Adv Directive             [] Completion of Portable DNR order             [] Other (Specify)    Date Referral Received: 5/3/21    Today's Outreach:  [] First   [] Second  [x] Third                                           Third outreach made by []  phone  [x] email []   [employer]     Intervention:  [] Spoke with Patient   [] Left VM requesting return call      Outcome:  E-mailed patient information on ACP as well as my contact info if patient would like to proceed with ACP. Will closed referral at this time. Next Step:   [] ACP scheduled conversation  [] Outreach again in one week               [] Email / Mail ACP Info Sheets  [] Email / Mail Advance Directive   [x]  Closing referral.  Routing closure to referring provider/staff and to ACP Specialist .      Thank you for this referral.

## 2021-08-16 ENCOUNTER — TELEPHONE (OUTPATIENT)
Dept: FAMILY MEDICINE CLINIC | Age: 76
End: 2021-08-16

## 2021-08-16 NOTE — TELEPHONE ENCOUNTER
Call From Dr Vero Almaguer (records in epic)    Pt in [address] (her brother in law is doc above and [place] and is a physician)    Has had ekg--see chart  Has labs, see chart    Got started on eliquis (has 10 d)    Got an echo (non rheumatic valvular)    Mild MR, Mild AI    Needs appt and cards    Requested staff to add patient to our office visit this Thursday and will ref to cards at that point/get ekg again if needed  Pt to be notified by phone    Gisell Juarez MD  [facility]  08/16/21 9:45 AM

## 2021-08-19 ENCOUNTER — OFFICE VISIT (OUTPATIENT)
Dept: FAMILY MEDICINE CLINIC | Age: 76
End: 2021-08-19
Payer: MEDICARE

## 2021-08-19 VITALS
BODY MASS INDEX: 37.08 KG/M2 | SYSTOLIC BLOOD PRESSURE: 126 MMHG | WEIGHT: 177.4 LBS | RESPIRATION RATE: 18 BRPM | OXYGEN SATURATION: 98 % | DIASTOLIC BLOOD PRESSURE: 84 MMHG | TEMPERATURE: 97 F | HEART RATE: 84 BPM

## 2021-08-19 DIAGNOSIS — I48.91 NEW ONSET A-FIB (HCC): ICD-10-CM

## 2021-08-19 DIAGNOSIS — E78.5 HYPERLIPIDEMIA, UNSPECIFIED HYPERLIPIDEMIA TYPE: ICD-10-CM

## 2021-08-19 DIAGNOSIS — I10 BENIGN ESSENTIAL HYPERTENSION: Primary | ICD-10-CM

## 2021-08-19 DIAGNOSIS — F41.8 DEPRESSION WITH ANXIETY: ICD-10-CM

## 2021-08-19 PROCEDURE — 1101F PT FALLS ASSESS-DOCD LE1/YR: CPT | Performed by: FAMILY MEDICINE

## 2021-08-19 PROCEDURE — 99214 OFFICE O/P EST MOD 30 MIN: CPT | Performed by: FAMILY MEDICINE

## 2021-08-19 PROCEDURE — 1090F PRES/ABSN URINE INCON ASSESS: CPT | Performed by: FAMILY MEDICINE

## 2021-08-19 PROCEDURE — 3017F COLORECTAL CA SCREEN DOC REV: CPT | Performed by: FAMILY MEDICINE

## 2021-08-19 PROCEDURE — G8427 DOCREV CUR MEDS BY ELIG CLIN: HCPCS | Performed by: FAMILY MEDICINE

## 2021-08-19 PROCEDURE — G8536 NO DOC ELDER MAL SCRN: HCPCS | Performed by: FAMILY MEDICINE

## 2021-08-19 PROCEDURE — G8752 SYS BP LESS 140: HCPCS | Performed by: FAMILY MEDICINE

## 2021-08-19 PROCEDURE — G8754 DIAS BP LESS 90: HCPCS | Performed by: FAMILY MEDICINE

## 2021-08-19 PROCEDURE — G9717 DOC PT DX DEP/BP F/U NT REQ: HCPCS | Performed by: FAMILY MEDICINE

## 2021-08-19 PROCEDURE — G8417 CALC BMI ABV UP PARAM F/U: HCPCS | Performed by: FAMILY MEDICINE

## 2021-08-19 PROCEDURE — G8400 PT W/DXA NO RESULTS DOC: HCPCS | Performed by: FAMILY MEDICINE

## 2021-08-19 RX ORDER — ALBUTEROL SULFATE 90 UG/1
2 AEROSOL, METERED RESPIRATORY (INHALATION)
Qty: 1 INHALER | Refills: 1 | Status: SHIPPED | OUTPATIENT
Start: 2021-08-19 | End: 2021-12-13 | Stop reason: SDUPTHER

## 2021-08-19 RX ORDER — CEFUROXIME AXETIL 500 MG/1
500 TABLET ORAL 2 TIMES DAILY
COMMUNITY
End: 2022-03-28

## 2021-08-19 NOTE — PROGRESS NOTES
Chief Complaint   Patient presents with    Follow-up     Needs referral for cardiology     1. Have you been to the ER, urgent care clinic since your last visit? Hospitalized since your last visit? No    2. Have you seen or consulted any other health care providers outside of the [address] [place] [address] since your last visit? Include any pap smears or colon screening.  No

## 2021-08-19 NOTE — PROGRESS NOTES
Family Medicine Follow-Up Progress Note  Patient: Ammon Zavala  1945, 77 y.o., female  Encounter Date: 8/19/2021    ASSESSMENT & PLAN    ICD-10-CM ICD-9-CM    1. Benign essential hypertension  I10 401.1 REFERRAL TO CARDIOLOGY      ECHO ADULT COMPLETE   2. New onset a-fib ([address]  I48.91 427.31 REFERRAL TO CARDIOLOGY      ECHO ADULT COMPLETE   3. Hyperlipidemia, unspecified hyperlipidemia type  E78.5 272.4    4. Depression with anxiety  F41.8 300.4        Orders Placed This Encounter    [facility]     Referral Priority:   Routine     Referral Type:   Consultation     Referral Reason:   Specialty Services Required     Referred to Provider:   Christian Flores MD     Number of Visits Requested:   1    DISCONTD: apixaban (ELIQUIS) 5 mg tablet     Sig: Take 5 mg by mouth two (2) times a day.  cefUROXime (CEFTIN) 500 mg tablet     Sig: Take 500 mg by mouth two (2) times a day.  apixaban (ELIQUIS) 5 mg tablet     Sig: Take 1 Tablet by mouth two (2) times a day. Dispense:  60 Tablet     Refill:  11    albuterol (ProAir HFA) 90 mcg/actuation inhaler     Sig: Take 2 Puffs by inhalation every six (6) hours as needed for Wheezing.      Dispense:  1 Inhaler     Refill:  1     Already scheduled to see cardio  Stay on ac b/c of chads 2 vasc unless cardiology guides different  Recommend healthy habits  C/w bp medications  Rate control with bb  Records reviewed in care everywhere from care in [facility] to doctor who cared for her in [place], see my note  F/u with cardiolgoy next    [address] Complaint   Patient presents with    Follow-up     Needs referral for cardiology       Kathy Angelo is a 77 y.o. female presenting today for follow up  Was on vacation, found to be in afib  Had ekg, started on eliquis   Was already rate controlled  I spoke to md --se my telephone note  Atrial Fibrillation CHADSVASC2 Score Stroke Risk:   77 y.o. > 76        +4    female Female +1   CHF HX: No    + 0   HTN HX: Yes    +1   Stroke/TIA/Thromboembolism No    +0   Vascular Disease HX: No    + 0   Diabetes Mellitus No    + 0   CHADSVASC 2 Score 4      Annual Stroke Risk 4.8%- moderate-high        She feels fine  Finds this frustrating    HTN: patient reports stable on medications. No chest pain, sob, headache, blurred vision, leg swelling, diaphoresis, falls. Compliant with medications as prescribed. Is not consistently checking blood pressures at home and reports range is unksnown. No significant hyper or hypotensive episodes that the patient reports today. ROS  Review of Systems  A 12 point review of systems was negative except as noted here or in the HPI. OBJECTIVE  Visit Vitals  /84   Pulse 84   Temp 97 °F (36.1 °C) (Temporal)   Resp 18   Wt 177 lb 6.4 oz (80.5 kg)   SpO2 98%   BMI 37.08 kg/m²     Wt Readings from Last 3 Encounters:   08/19/21 177 lb 6.4 oz (80.5 kg)   01/14/21 180 lb 8.9 oz (81.9 kg)   04/22/20 175 lb (79.4 kg)       Physical Exam  Constitutional:       General: She is not in acute distress. Appearance: Normal appearance. She is not ill-appearing, toxic-appearing or diaphoretic. HENT:      Head: Normocephalic and atraumatic. Right Ear: External ear normal.      Left Ear: External ear normal.      Mouth/Throat:      Mouth: Mucous membranes are moist.   Eyes:      General: No scleral icterus. Right eye: No discharge. Left eye: No discharge. Comments: eom grossly intact   Neck:      Comments: No visible neck masses , ROM appears normal from visual inspection  Cardiovascular:      Rate and Rhythm: Rhythm irregularly irregular. Pulmonary:      Effort: Pulmonary effort is normal. No respiratory distress. Skin:     Comments: Visible skin is without jaundice, bruising, lesion, pallor, erythema or rash except as otherwise noted   Neurological:      General: No focal deficit present.       Mental Status: She is alert and oriented to person, place, and time. Psychiatric:         Mood and Affect: Mood normal.         Behavior: Behavior normal.         Thought Content: Thought content normal.         Judgment: Judgment normal.         No results found for any visits on 08/19/21. HISTORICAL  Reviewed and updated today, and as noted below:    Past Medical History:   Diagnosis Date    Arthritis     Asthma     Calculus of kidney     Congestive heart failure (HCC)     Depression with anxiety     Hypertension      History reviewed. No pertinent surgical history. Family History   Problem Relation Age of Onset   [employer] Cancer Mother     Breast Cancer Mother     Stroke Father     Heart Disease Father     Breast Cancer Niece     Uterine Cancer Sister      Social History     Tobacco Use   Smoking Status Never Smoker   Smokeless Tobacco Never Used     Social History     Socioeconomic History    Marital status:      Spouse name: Not on file    Number of children: Not on file    Years of education: Not on file    Highest education level: Not on file   Tobacco Use    Smoking status: Never Smoker    Smokeless tobacco: Never Used   Vaping Use    Vaping Use: Never used   Substance and Sexual Activity    Alcohol use: Yes     Social Determinants of Health     Financial Resource Strain:     Difficulty of Paying Living Expenses:    Food Insecurity:     Worried About Running Out of Food in the Last Year:     Ran Out of Food in the Last Year:    Transportation Needs:     Lack of Transportation (Medical):      Lack of Transportation (Non-Medical):    Physical Activity:     Days of Exercise per Week:     Minutes of Exercise per Session:    Stress:     Feeling of Stress :    Social Connections:     Frequency of Communication with Friends and Family:     Frequency of Social Gatherings with Friends and Family:     Attends Confucianism Services:     Active Member of Clubs or Organizations:     Attends Club or Organization Meetings:    Saul Marital Status:      No Known Allergies    LAB REVIEW  Lab Results   Component Value Date/Time    Sodium 143 12/31/2020 09:07 AM    Potassium 4.7 12/31/2020 09:07 AM    Chloride 98 12/31/2020 09:07 AM    CO2 30 (H) 12/31/2020 09:07 AM    Glucose 102 (H) 12/31/2020 09:07 AM    BUN 17 12/31/2020 09:07 AM    Creatinine 0.81 12/31/2020 09:07 AM    BUN/Creatinine ratio 21 12/31/2020 09:07 AM    GFR est AA 83 12/31/2020 09:07 AM    GFR est non-AA 72 12/31/2020 09:07 AM    Calcium 9.3 12/31/2020 09:07 AM    Bilirubin, total 0.5 12/31/2020 09:07 AM    Alk. phosphatase 67 12/31/2020 09:07 AM    Protein, total 6.7 12/31/2020 09:07 AM    Albumin 4.3 12/31/2020 09:07 AM    A-G Ratio 1.8 12/31/2020 09:07 AM    ALT (SGPT) 21 12/31/2020 09:07 AM     Lab Results   Component Value Date/Time    WBC 7.6 12/31/2020 09:07 AM    HGB 12.9 12/31/2020 09:07 AM    HCT 38.4 12/31/2020 09:07 AM    PLATELET 589 62/03/8154 09:07 AM    MCV 97 12/31/2020 09:07 AM     No results found for: HBA1C, UAQ5OXQC, XFR3TBHK, IMF1DVZT  Lab Results   Component Value Date/Time    Cholesterol, total 182 12/31/2020 09:07 AM    HDL Cholesterol 62 12/31/2020 09:07 AM    LDL, calculated 92 12/31/2020 09:07 AM    LDL, calculated 131 (H) 09/09/2019 11:53 AM    VLDL, calculated 28 12/31/2020 09:07 AM    VLDL, calculated 18 09/09/2019 11:53 AM    Triglyceride 166 (H) 12/31/2020 09:07 AM           Carli Marinelli MD  [facility]  08/19/21 10:34 AM    Portions of this note may have been populated using smart dictation software and may have \"sounds-like\" errors present. Pt was counseled on risks, benefits and alternatives of treatment options. All questions were asked and answered and the patient was agreeable with the treatment plan as outlined.

## 2021-08-25 ENCOUNTER — PATIENT OUTREACH (OUTPATIENT)
Dept: CASE MANAGEMENT | Age: 76
End: 2021-08-25

## 2021-08-27 NOTE — PROGRESS NOTES
Social Work Note  SW received referral from pt's MD requesting a follow up call with patient to discuss options for medication assistance. SW made two attempts to reach patient by leaving voice mails requesting a call back. SW will follow up at a later date once call is returned.     Hanane Bazzi, [facility] Team   [facility] Coordination Team  [phone]

## 2021-08-30 ENCOUNTER — PATIENT OUTREACH (OUTPATIENT)
Dept: CASE MANAGEMENT | Age: 76
End: 2021-08-30

## 2021-09-03 ENCOUNTER — HOSPITAL ENCOUNTER (OUTPATIENT)
Dept: NON INVASIVE DIAGNOSTICS | Age: 76
Discharge: HOME | End: 2021-09-03
Attending: FAMILY MEDICINE
Payer: MEDICARE

## 2021-09-03 DIAGNOSIS — I48.91 NEW ONSET A-FIB (HCC): ICD-10-CM

## 2021-09-03 DIAGNOSIS — I10 BENIGN ESSENTIAL HYPERTENSION: ICD-10-CM

## 2021-09-03 LAB
ECHO AR MAX VEL PISA: 449.22 CM/S
ECHO AV AREA PEAK VELOCITY: 2.19 CM2
ECHO AV PEAK GRADIENT: 6.31 MMHG
ECHO AV PEAK VELOCITY: 125.59 CM/S
ECHO AV REGURGITANT PHT: 554.33 MS
ECHO LVOT DIAM: 1.93 CM
ECHO LVOT PEAK GRADIENT: 3.56 MMHG
ECHO LVOT PEAK VELOCITY: 94.32 CM/S
ECHO RV TAPSE: 1.28 CM (ref 1.5–2)

## 2021-09-03 PROCEDURE — 93306 TTE W/DOPPLER COMPLETE: CPT

## 2021-09-03 PROCEDURE — 93306 TTE W/DOPPLER COMPLETE: CPT | Performed by: INTERNAL MEDICINE

## 2021-09-10 ENCOUNTER — PATIENT OUTREACH (OUTPATIENT)
Dept: CASE MANAGEMENT | Age: 76
End: 2021-09-10

## 2021-09-21 ENCOUNTER — HOSPITAL ENCOUNTER (OUTPATIENT)
Dept: MAMMOGRAPHY | Age: 76
Discharge: HOME | End: 2021-09-21
Attending: FAMILY MEDICINE
Payer: MEDICARE

## 2021-09-21 DIAGNOSIS — Z78.0 ASYMPTOMATIC MENOPAUSE: ICD-10-CM

## 2021-09-21 PROCEDURE — 77080 DXA BONE DENSITY AXIAL: CPT

## 2021-09-28 ENCOUNTER — OFFICE VISIT (OUTPATIENT)
Dept: CARDIOLOGY CLINIC | Age: 76
End: 2021-09-28
Payer: MEDICARE

## 2021-09-28 VITALS
OXYGEN SATURATION: 97 % | WEIGHT: 176 LBS | BODY MASS INDEX: 36.94 KG/M2 | DIASTOLIC BLOOD PRESSURE: 88 MMHG | SYSTOLIC BLOOD PRESSURE: 128 MMHG | HEIGHT: 58 IN | HEART RATE: 91 BPM

## 2021-09-28 DIAGNOSIS — I48.91 ATRIAL FIBRILLATION, UNSPECIFIED TYPE (HCC): Primary | ICD-10-CM

## 2021-09-28 DIAGNOSIS — I10 BENIGN ESSENTIAL HYPERTENSION: ICD-10-CM

## 2021-09-28 PROCEDURE — 93005 ELECTROCARDIOGRAM TRACING: CPT | Performed by: SPECIALIST

## 2021-09-28 PROCEDURE — G8399 PT W/DXA RESULTS DOCUMENT: HCPCS | Performed by: SPECIALIST

## 2021-09-28 PROCEDURE — G0463 HOSPITAL OUTPT CLINIC VISIT: HCPCS | Performed by: SPECIALIST

## 2021-09-28 PROCEDURE — G9717 DOC PT DX DEP/BP F/U NT REQ: HCPCS | Performed by: SPECIALIST

## 2021-09-28 PROCEDURE — 1090F PRES/ABSN URINE INCON ASSESS: CPT | Performed by: SPECIALIST

## 2021-09-28 PROCEDURE — G8752 SYS BP LESS 140: HCPCS | Performed by: SPECIALIST

## 2021-09-28 PROCEDURE — G8754 DIAS BP LESS 90: HCPCS | Performed by: SPECIALIST

## 2021-09-28 PROCEDURE — 1101F PT FALLS ASSESS-DOCD LE1/YR: CPT | Performed by: SPECIALIST

## 2021-09-28 PROCEDURE — 3017F COLORECTAL CA SCREEN DOC REV: CPT | Performed by: SPECIALIST

## 2021-09-28 PROCEDURE — G8427 DOCREV CUR MEDS BY ELIG CLIN: HCPCS | Performed by: SPECIALIST

## 2021-09-28 PROCEDURE — 99215 OFFICE O/P EST HI 40 MIN: CPT | Performed by: SPECIALIST

## 2021-09-28 PROCEDURE — G8536 NO DOC ELDER MAL SCRN: HCPCS | Performed by: SPECIALIST

## 2021-09-28 PROCEDURE — 93010 ELECTROCARDIOGRAM REPORT: CPT | Performed by: SPECIALIST

## 2021-09-28 PROCEDURE — G8417 CALC BMI ABV UP PARAM F/U: HCPCS | Performed by: SPECIALIST

## 2021-09-28 NOTE — PROGRESS NOTES
Chief Complaint   Patient presents with    Annual Exam     Former [address] patient    Hypertension    Shortness of Breath    Other     MR, BISMARK     Visit Vitals  /88 (BP 1 Location: Left upper arm, BP Patient Position: Sitting, BP Cuff Size: Large adult)   Pulse 91   Ht 4' 10\" (1.473 m)   Wt 176 lb (79.8 kg)   SpO2 97%   BMI 36.78 kg/m²     Chest pain denied   SOB comes and goes; about the same  Palpitations denied   Swelling in hands/feet denied   Dizziness denied   Recent hospital stays denied   Refills denied     Doesn't want to take Eliquis anymore    Went into a fib and brother in law put her on Eliquis, saw Dr. Vasquez Began.

## 2021-09-28 NOTE — PROGRESS NOTES
Gerry Gracia MD. Formerly [facility]              Patient: Tanner Zavala  : 1945      Today's Date: 2021            HISTORY OF PRESENT ILLNESS:     History of Present Illness:    Referred for Afib. He went to visit sister in [place] - brother in law checked pulse and it was irregular and she was found to be in Afib then. She feels fine. Has chronic class 2 [place]EN[place]. She has no palpitations - she feels at her baseline. PAST MEDICAL HISTORY:     Past Medical History:   Diagnosis Date    Arthritis     Asthma     Atrial fibrillation ([address]     Afib discovered  - asymptomatic     Calculus of kidney     Congestive heart failure (HCC)     Depression with anxiety     Hypertension            MEDICATIONS:     Current Outpatient Medications   Medication Sig Dispense Refill    apixaban (ELIQUIS) 5 mg tablet Take 1 Tablet by mouth two (2) times a day. 60 Tablet 11    albuterol (ProAir HFA) 90 mcg/actuation inhaler Take 2 Puffs by inhalation every six (6) hours as needed for Wheezing. 1 Inhaler 1    pravastatin (PRAVACHOL) 20 mg tablet Take 1 tablet by mouth nightly 90 Tab 3    citalopram (CELEXA) 20 mg tablet Take 1 Tab by mouth daily. 90 Tab 3    hydroCHLOROthiazide (HYDRODIURIL) 25 mg tablet Take 1 Tab by mouth daily. 90 Tab 1    losartan (COZAAR) 100 mg tablet Take 1 Tab by mouth daily. 90 Tab 1    metoprolol succinate (TOPROL-XL) 50 mg XL tablet Take 2 Tabs by mouth daily. 180 Tab 1    cholecalciferol, vitamin D3, (VITAMIN D3 PO) Take  by mouth.  cefUROXime (CEFTIN) 500 mg tablet Take 500 mg by mouth two (2) times a day. No Known Allergies          SOCIAL HISTORY:     Social History     Tobacco Use    Smoking status: Never Smoker    Smokeless tobacco: Never Used   Vaping Use    Vaping Use: Never used   Substance Use Topics    Alcohol use:  Yes    Drug use: Not on file         FAMILY HISTORY:     Family History   Problem Relation Age of Onset    Cancer Mother  Breast Cancer Mother     Stroke Father     Heart Disease Father     Breast Cancer Niece     Uterine Cancer Sister            REVIEW OF SYMPTOMS:     Review of Symptoms:  Constitutional: Negative for fever, chills  HEENT: Negative for nosebleeds, tinnitus, and vision changes. Respiratory: Negative for cough, wheezing  Cardiovascular: Negative for orthopnea, claudication, syncope, and PND. Gastrointestinal: Negative for abdominal pain, diarrhea, melena. Genitourinary: Negative for dysuria  Musculoskeletal: Negative for myalgias. Skin: Negative for rash  Heme: No problems bleeding. Neurological: Negative for speech change and focal weakness. PHYSICAL EXAM:     Physical Exam:  Visit Vitals  /88 (BP 1 Location: Left upper arm, BP Patient Position: Sitting, BP Cuff Size: Large adult)   Pulse 91   Ht 4' 10\" (1.473 m)   Wt 176 lb (79.8 kg)   SpO2 97%   BMI 36.78 kg/m²      Patient appears generally well, mood and affect are appropriate and pleasant. HEENT:  Hearing intact, non-icteric, normocephalic, atraumatic. Neck Exam: Supple, No JVD or carotid bruits. Lung Exam: Clear to auscultation, even breath sounds. Cardiac Exam: Irregularly, irregular with no murmur or rub  Abdomen: Soft, non-tender, normal bowel sounds. Obesity   Extremities: MAW, No lower extremity edema.   MSKTL: Overall good ROM ext  Skin: No significant rashes  Psych: Appropriate affect  Neuro - Grossly intact          LABS / OTHER STUDIES reviewed:     Lab Results   Component Value Date/Time    Sodium 143 12/31/2020 09:07 AM    Potassium 4.7 12/31/2020 09:07 AM    Chloride 98 12/31/2020 09:07 AM    CO2 30 (H) 12/31/2020 09:07 AM    Glucose 102 (H) 12/31/2020 09:07 AM    BUN 17 12/31/2020 09:07 AM    Creatinine 0.81 12/31/2020 09:07 AM    BUN/Creatinine ratio 21 12/31/2020 09:07 AM    GFR est AA 83 12/31/2020 09:07 AM    GFR est non-AA 72 12/31/2020 09:07 AM    Calcium 9.3 12/31/2020 09:07 AM    Bilirubin, total 0.5 12/31/2020 09:07 AM    Alk. phosphatase 67 12/31/2020 09:07 AM    Protein, total 6.7 12/31/2020 09:07 AM    Albumin 4.3 12/31/2020 09:07 AM    A-G Ratio 1.8 12/31/2020 09:07 AM    ALT (SGPT) 21 12/31/2020 09:07 AM    AST (SGOT) 29 12/31/2020 09:07 AM     Lab Results   Component Value Date/Time    WBC 7.6 12/31/2020 09:07 AM    HGB 12.9 12/31/2020 09:07 AM    HCT 38.4 12/31/2020 09:07 AM    PLATELET 693 14/17/2892 09:07 AM    MCV 97 12/31/2020 09:07 AM       Lab Results   Component Value Date/Time    Cholesterol, total 182 12/31/2020 09:07 AM    HDL Cholesterol 62 12/31/2020 09:07 AM    LDL, calculated 92 12/31/2020 09:07 AM    LDL, calculated 131 (H) 09/09/2019 11:53 AM    VLDL, calculated 28 12/31/2020 09:07 AM    VLDL, calculated 18 09/09/2019 11:53 AM    Triglyceride 166 (H) 12/31/2020 09:07 AM     Labs 8/21 - CBC, TSH, BMP OK           CARDIAC DIAGNOSTICS:     Cardiac Evaluation Includes:  I reviewed the results below. She was seen by Dr Harris Ayala in [place]. Stress nuclear study in 2013 was abnormal, leading to a cath, which was reportedly normal.    Echo 2/28/18 - EF 60%, moderate AR, moderate MR, moderate LAE    Aortic duplex 2/28/18  - Normal    Echo 9/3/21 - TDS, LVEF 50-55%, mild AI/MR, mod LAE    EKG 4/4/19 - NSR, normal   EKG 9/28/21 - Afib         ASSESSMENT AND PLAN:     Assessment and Plan:      1) Afib   - discovered 8/21 - onset unknown   - asymptomatic   - [id number] score is 4.    - She is unable to afford Eliquis --> agreeable to take coumadin which we'll start. Discussed Watchman.   - HR OK on Toprol XL  - Discussed option of IRIS/DCC but she declines as she thinks she may have been in afib a while   - continue rate control and OAC     2) AI/MR  - mild AI/MR on recent echo   - will follow on serial studies     3) See me in 6 months. She moved from [place] to [place], [place] 3/15/19 to live closer to her son.     Hector Ramos MD, [address] [place] [address] Don [address] [place] [address], Suite [facility]. Suite [facility] [address]  Ph: [phone]    [phone]        Total time (preparing to see the patient, reviewing data, seeing patient face to fine, counseling patient, documenting information, etc) was 45  min.

## 2021-10-25 ENCOUNTER — TELEPHONE (OUTPATIENT)
Dept: CARDIOLOGY CLINIC | Age: 76
End: 2021-10-25

## 2021-10-25 ENCOUNTER — ANTI-COAG VISIT (OUTPATIENT)
Dept: CARDIOLOGY CLINIC | Age: 76
End: 2021-10-25
Payer: MEDICARE

## 2021-10-25 DIAGNOSIS — Z79.01 ANTICOAGULATED ON COUMADIN: Primary | ICD-10-CM

## 2021-10-25 LAB
INR BLD: 1.3
INR, EXTERNAL: 1.3 (ref 2–3)
PT POC: 15.3 SECONDS
VALID INTERNAL CONTROL?: YES

## 2021-10-25 PROCEDURE — 85610 PROTHROMBIN TIME: CPT | Performed by: SPECIALIST

## 2021-10-25 RX ORDER — WARFARIN SODIUM 5 MG/1
5 TABLET ORAL DAILY
Qty: 30 TABLET | Refills: 3 | Status: SHIPPED | OUTPATIENT
Start: 2021-10-25 | End: 2022-01-31 | Stop reason: SDUPTHER

## 2021-10-25 RX ORDER — WARFARIN SODIUM 5 MG/1
5 TABLET ORAL DAILY
COMMUNITY
Start: 2021-10-25 | End: 2021-10-25 | Stop reason: SDUPTHER

## 2021-10-25 NOTE — TELEPHONE ENCOUNTER
Per Katelyn Turcios: \"Please order warfarin,5mg tab one every day,to start today,cannot afford Eliquis.,'s pt. \"

## 2021-10-27 ENCOUNTER — PATIENT OUTREACH (OUTPATIENT)
Dept: CASE MANAGEMENT | Age: 76
End: 2021-10-27

## 2021-10-29 NOTE — PROGRESS NOTES
Goals Addressed                 This Visit's Progress     Identification of barriers to adherence to a plan of care such as inability to afford medications, lack of insurance, lack of transportation, etc.          10/27/21  SW received voicemail from pt requesting assistance with identifying patient assistance to cover cost for Eliquis. SW returned pt's call and left a voicemail requesting call back. SW will follow up once call is returned. Gregory Pina, [facility] Ambulatory Care Coordination Team  [phone]     8/30/2021  Follow up  SW received referral from pt's MD requesting a follow up with patient to discuss options for medication cost assistance for new medication Eliquis. Patient reported that she purchased a 30day supply for $500.00. She was not given a coupon card and she does not have Medicare Part D for prescription coverage. Patient typically has all of her medications filled at [address] for  $190/90day supply. Patient is receptive to switching to Warfarin or Coumadin if needed. Patient also expressed concern with her current Medicare supplement plan and was interested in exploring alternative options for coverage. Plan  SW discussed with patient options to possibly cover cost of medication, which included patient assistance program through [employer], applying for PT/INR home monitoring system and changing medications if recommended by provider. Patient stated that she wanted to follow up with her Cardiologist this Friday to determine the need for medication moving forward and options. Patient will contact MISTY after appointment to discuss plan. SW also provided her with contact information to [employer]orna to discuss options for a different Medicare plan. SW will follow up at a later date.   Gregory Pina, [facility] Team   [facility] Coordination Team  [phone]

## 2021-11-01 ENCOUNTER — ANTI-COAG VISIT (OUTPATIENT)
Dept: CARDIOLOGY CLINIC | Age: 76
End: 2021-11-01
Payer: MEDICARE

## 2021-11-01 DIAGNOSIS — I48.0 PAROXYSMAL ATRIAL FIBRILLATION (HCC): Primary | ICD-10-CM

## 2021-11-01 LAB
INR BLD: 1.8
INR, EXTERNAL: 1.8 (ref 2–3)
PT POC: 21.8 SECONDS
VALID INTERNAL CONTROL?: YES

## 2021-11-01 PROCEDURE — 85610 PROTHROMBIN TIME: CPT | Performed by: SPECIALIST

## 2021-11-06 DIAGNOSIS — I10 BENIGN ESSENTIAL HYPERTENSION: ICD-10-CM

## 2021-11-08 ENCOUNTER — ANTI-COAG VISIT (OUTPATIENT)
Dept: CARDIOLOGY CLINIC | Age: 76
End: 2021-11-08
Payer: MEDICARE

## 2021-11-08 DIAGNOSIS — I48.0 PAROXYSMAL ATRIAL FIBRILLATION (HCC): Primary | ICD-10-CM

## 2021-11-08 LAB
INR BLD: 3.2
INR, EXTERNAL: 3.2 (ref 2–3)
PT POC: 41.4 SECONDS
VALID INTERNAL CONTROL?: YES

## 2021-11-08 PROCEDURE — 85610 PROTHROMBIN TIME: CPT | Performed by: SPECIALIST

## 2021-11-08 RX ORDER — HYDROCHLOROTHIAZIDE 25 MG/1
25 TABLET ORAL DAILY
Qty: 90 TABLET | Refills: 1 | Status: SHIPPED | OUTPATIENT
Start: 2021-11-08 | End: 2022-04-18

## 2021-11-08 RX ORDER — METOPROLOL SUCCINATE 50 MG/1
100 TABLET, EXTENDED RELEASE ORAL DAILY
Qty: 180 TABLET | Refills: 1 | Status: SHIPPED | OUTPATIENT
Start: 2021-11-08 | End: 2022-03-28

## 2021-11-15 ENCOUNTER — ANTI-COAG VISIT (OUTPATIENT)
Dept: CARDIOLOGY CLINIC | Age: 76
End: 2021-11-15
Payer: MEDICARE

## 2021-11-15 DIAGNOSIS — I48.0 PAROXYSMAL ATRIAL FIBRILLATION (HCC): Primary | ICD-10-CM

## 2021-11-15 LAB
INR BLD: 3.4
INR, EXTERNAL: 3.4 (ref 2–3)
PT POC: 40.9 SECONDS
VALID INTERNAL CONTROL?: YES

## 2021-11-15 PROCEDURE — 85610 PROTHROMBIN TIME: CPT | Performed by: SPECIALIST

## 2021-11-23 LAB — INR, EXTERNAL: 1.5 (ref 2–3)

## 2021-11-29 ENCOUNTER — TELEPHONE ANTICOAG (OUTPATIENT)
Dept: CARDIOLOGY CLINIC | Age: 76
End: 2021-11-29

## 2021-11-29 DIAGNOSIS — I48.0 PAROXYSMAL ATRIAL FIBRILLATION (HCC): Primary | ICD-10-CM

## 2021-11-30 LAB — INR, EXTERNAL: 1.8 (ref 2–3)

## 2021-12-01 ENCOUNTER — TELEPHONE ANTICOAG (OUTPATIENT)
Dept: CARDIOLOGY CLINIC | Age: 76
End: 2021-12-01

## 2021-12-01 DIAGNOSIS — I48.0 PAROXYSMAL ATRIAL FIBRILLATION (HCC): Primary | ICD-10-CM

## 2021-12-07 LAB — INR, EXTERNAL: 2.4 (ref 2–3)

## 2021-12-08 ENCOUNTER — TELEPHONE ANTICOAG (OUTPATIENT)
Dept: CARDIOLOGY CLINIC | Age: 76
End: 2021-12-08

## 2021-12-08 DIAGNOSIS — I48.0 PAROXYSMAL ATRIAL FIBRILLATION (HCC): Primary | ICD-10-CM

## 2021-12-13 RX ORDER — ALBUTEROL SULFATE 90 UG/1
2 AEROSOL, METERED RESPIRATORY (INHALATION)
Qty: 1 EACH | Refills: 2 | Status: SHIPPED | OUTPATIENT
Start: 2021-12-13 | End: 2022-04-11 | Stop reason: SDUPTHER

## 2021-12-16 ENCOUNTER — TELEPHONE ANTICOAG (OUTPATIENT)
Dept: CARDIOLOGY CLINIC | Age: 76
End: 2021-12-16

## 2021-12-16 DIAGNOSIS — I48.0 PAROXYSMAL ATRIAL FIBRILLATION (HCC): Primary | ICD-10-CM

## 2021-12-16 LAB — INR, EXTERNAL: 4.4 (ref 2–3)

## 2022-01-05 ENCOUNTER — PATIENT OUTREACH (OUTPATIENT)
Dept: CASE MANAGEMENT | Age: 77
End: 2022-01-05

## 2022-01-05 NOTE — PROGRESS NOTES
Social Work care management program ended 1/5/22. Patient/family has the ability to self-manage at this time Care transitions goals have been completed. No further Care Transition Social Work follow up scheduled. Goals Addressed                 This Visit's Progress     Identification of barriers to adherence to a plan of care such as inability to afford medications, lack of insurance, lack of transportation, etc.          1/5/2022  SW completed chart review since previous outreach attempts. No calls have been returned. There is no further SW outreach scheduled at this time. Garner Gottron, [address] [place] [address] Transitions Team   [facility] Coordination Team  [phone]     10/27/21  SW received voicemail from pt requesting assistance with identifying patient assistance to cover cost for Eliquis. SW returned pt's call and left a voicemail requesting call back. SW will follow up once call is returned. Garner Gottron, [facility] Ambulatory Care Coordination Team  [phone]     8/30/2021  Follow up  SW received referral from pt's MD requesting a follow up with patient to discuss options for medication cost assistance for new medication Eliquis. Patient reported that she purchased a 30day supply for $500.00. She was not given a coupon card and she does not have Medicare Part D for prescription coverage. Patient typically has all of her medications filled at [address] for  $190/90day supply. Patient is receptive to switching to Warfarin or Coumadin if needed. Patient also expressed concern with her current Medicare supplement plan and was interested in exploring alternative options for coverage.       Plan  MISTY discussed with patient options to possibly cover cost of medication, which included patient assistance program through [employer], applying for PT/INR home monitoring system and changing medications if recommended by provider. Patient stated that she wanted to follow up with her Cardiologist this Friday to determine the need for medication moving forward and options. Patient will contact SW after appointment to discuss plan. SW also provided her with contact information to [employer]orna to discuss options for a different Medicare plan. SW will follow up at a later date. Shawanda Villagomez, [address] [place] [address] Transitions Team   [address] [place] [address] Ambulatory Care Coordination Team  [phone]               Patient has Care Transitions  contact information for any further questions, concerns, or needs.   Patients upcoming visits:    Future Appointments   Date Time Provider Kary Rowland   3/31/2022  1:20 PM Shweta Martell MD [facility] AMB

## 2022-01-06 LAB — INR, EXTERNAL: 2.2 (ref 2–3)

## 2022-01-07 ENCOUNTER — TELEPHONE ANTICOAG (OUTPATIENT)
Dept: CARDIOLOGY CLINIC | Age: 77
End: 2022-01-07

## 2022-01-07 DIAGNOSIS — I48.0 PAROXYSMAL ATRIAL FIBRILLATION (HCC): Primary | ICD-10-CM

## 2022-01-14 ENCOUNTER — TELEPHONE ANTICOAG (OUTPATIENT)
Dept: CARDIOLOGY CLINIC | Age: 77
End: 2022-01-14

## 2022-01-14 DIAGNOSIS — I48.0 PAROXYSMAL ATRIAL FIBRILLATION (HCC): Primary | ICD-10-CM

## 2022-01-14 LAB — INR, EXTERNAL: 1.9 (ref 2–3)

## 2022-01-19 DIAGNOSIS — I10 BENIGN ESSENTIAL HYPERTENSION: ICD-10-CM

## 2022-01-19 RX ORDER — LOSARTAN POTASSIUM 100 MG/1
100 TABLET ORAL DAILY
Qty: 90 TABLET | Refills: 1 | Status: SHIPPED | OUTPATIENT
Start: 2022-01-19 | End: 2022-01-24 | Stop reason: SDUPTHER

## 2022-01-19 NOTE — TELEPHONE ENCOUNTER
Pt needs prescription for losartan transferred to [facility] is out of stock.

## 2022-01-24 DIAGNOSIS — I10 BENIGN ESSENTIAL HYPERTENSION: ICD-10-CM

## 2022-01-25 RX ORDER — LOSARTAN POTASSIUM 100 MG/1
100 TABLET ORAL DAILY
Qty: 90 TABLET | Refills: 1 | Status: SHIPPED | OUTPATIENT
Start: 2022-01-25 | End: 2022-01-27 | Stop reason: SDUPTHER

## 2022-01-27 DIAGNOSIS — I10 BENIGN ESSENTIAL HYPERTENSION: ICD-10-CM

## 2022-01-27 LAB — INR, EXTERNAL: 4.5 (ref 2–3)

## 2022-01-27 RX ORDER — LOSARTAN POTASSIUM 100 MG/1
100 TABLET ORAL DAILY
Qty: 90 TABLET | Refills: 1 | Status: SHIPPED | OUTPATIENT
Start: 2022-01-27 | End: 2022-03-11

## 2022-01-28 ENCOUNTER — TELEPHONE ANTICOAG (OUTPATIENT)
Dept: CARDIOLOGY CLINIC | Age: 77
End: 2022-01-28

## 2022-01-28 DIAGNOSIS — I48.0 PAROXYSMAL ATRIAL FIBRILLATION (HCC): Primary | ICD-10-CM

## 2022-02-04 RX ORDER — WARFARIN SODIUM 5 MG/1
5 TABLET ORAL DAILY
Qty: 90 TABLET | Refills: 1 | Status: SHIPPED | OUTPATIENT
Start: 2022-02-04 | End: 2022-03-28

## 2022-02-04 NOTE — TELEPHONE ENCOUNTER
Refill per VO of Dr. Armas Me  Last appt: 9/28/21  Future Appointments   Date Time Provider Kary Rowland   3/30/2022  3:40 PM Mathew Cortes MD [facility]       Requested Prescriptions     Pending Prescriptions Disp Refills    warfarin (Coumadin) 5 mg tablet 30 Tablet 3     Sig: Take 1 Tablet by mouth daily.  Indications: treatment to prevent blood clots in chronic atrial fibrillation

## 2022-02-11 ENCOUNTER — TELEPHONE ANTICOAG (OUTPATIENT)
Dept: CARDIOLOGY CLINIC | Age: 77
End: 2022-02-11

## 2022-02-11 ENCOUNTER — TELEPHONE (OUTPATIENT)
Dept: CARDIOLOGY CLINIC | Age: 77
End: 2022-02-11

## 2022-02-11 DIAGNOSIS — I48.0 PAROXYSMAL ATRIAL FIBRILLATION (HCC): Primary | ICD-10-CM

## 2022-02-11 LAB — INR, EXTERNAL: 1.4 (ref 2–3)

## 2022-02-11 NOTE — TELEPHONE ENCOUNTER
Dereck Lucio called from [facility] [place] with a critical INR 1.4 today on the patient.     [phone] patient

## 2022-02-11 NOTE — TELEPHONE ENCOUNTER
Pt notified via telephone,to take coumadin dose of 5mg tonight,per verbal order from [employer] Doxepin Counseling:  Patient advised that the medication is sedating and not to drive a car after taking this medication. Patient informed of potential adverse effects including but not limited to dry mouth, urinary retention, and blurry vision.  The patient verbalized understanding of the proper use and possible adverse effects of doxepin.  All of the patient's questions and concerns were addressed.

## 2022-02-23 ENCOUNTER — TELEPHONE ANTICOAG (OUTPATIENT)
Dept: CARDIOLOGY CLINIC | Age: 77
End: 2022-02-23

## 2022-02-23 DIAGNOSIS — I48.0 PAROXYSMAL ATRIAL FIBRILLATION (HCC): Primary | ICD-10-CM

## 2022-02-23 LAB — INR, EXTERNAL: 1.5 (ref 2–3)

## 2022-03-08 ENCOUNTER — TELEPHONE ANTICOAG (OUTPATIENT)
Dept: CARDIOLOGY CLINIC | Age: 77
End: 2022-03-08

## 2022-03-08 DIAGNOSIS — I48.0 PAROXYSMAL ATRIAL FIBRILLATION (HCC): Primary | ICD-10-CM

## 2022-03-08 LAB — INR, EXTERNAL: 2 (ref 2–3)

## 2022-03-11 DIAGNOSIS — I10 BENIGN ESSENTIAL HYPERTENSION: ICD-10-CM

## 2022-03-11 RX ORDER — LOSARTAN POTASSIUM 100 MG/1
TABLET ORAL
Qty: 90 TABLET | Refills: 0 | Status: SHIPPED | OUTPATIENT
Start: 2022-03-11 | End: 2022-04-11 | Stop reason: SDUPTHER

## 2022-03-18 PROBLEM — K57.30 DIVERTICULAR DISEASE OF COLON: Status: ACTIVE | Noted: 2019-09-09

## 2022-03-18 PROBLEM — K22.70 BARRETT'S ESOPHAGUS: Status: ACTIVE | Noted: 2019-09-09

## 2022-03-19 PROBLEM — E78.5 HYPERLIPIDEMIA: Status: ACTIVE | Noted: 2019-09-09

## 2022-03-19 PROBLEM — Z84.81 FAMILY HISTORY OF BRCA2 GENE POSITIVE: Status: ACTIVE | Noted: 2021-02-02

## 2022-03-19 PROBLEM — I10 BENIGN ESSENTIAL HYPERTENSION: Status: ACTIVE | Noted: 2019-04-04

## 2022-03-19 PROBLEM — E66.01 SEVERE OBESITY (HCC): Status: ACTIVE | Noted: 2019-04-04

## 2022-03-21 LAB — INR, EXTERNAL: 2.5 (ref 2–3)

## 2022-03-22 ENCOUNTER — TELEPHONE ANTICOAG (OUTPATIENT)
Dept: CARDIOLOGY CLINIC | Age: 77
End: 2022-03-22

## 2022-03-22 DIAGNOSIS — I48.0 PAROXYSMAL ATRIAL FIBRILLATION (HCC): Primary | ICD-10-CM

## 2022-03-28 ENCOUNTER — HOSPITAL ENCOUNTER (INPATIENT)
Age: 77
LOS: 1 days | Discharge: HOME HEALTH | DRG: 193 | End: 2022-03-29
Attending: STUDENT IN AN ORGANIZED HEALTH CARE EDUCATION/TRAINING PROGRAM | Admitting: FAMILY MEDICINE
Payer: MEDICARE

## 2022-03-28 ENCOUNTER — APPOINTMENT (OUTPATIENT)
Dept: GENERAL RADIOLOGY | Age: 77
DRG: 193 | End: 2022-03-28
Attending: STUDENT IN AN ORGANIZED HEALTH CARE EDUCATION/TRAINING PROGRAM
Payer: MEDICARE

## 2022-03-28 DIAGNOSIS — I10 BENIGN ESSENTIAL HYPERTENSION: ICD-10-CM

## 2022-03-28 DIAGNOSIS — I48.91 ATRIAL FIBRILLATION, UNSPECIFIED TYPE (HCC): ICD-10-CM

## 2022-03-28 DIAGNOSIS — J45.901 REACTIVE AIRWAY DISEASE WITH ACUTE EXACERBATION, UNSPECIFIED ASTHMA SEVERITY, UNSPECIFIED WHETHER PERSISTENT: ICD-10-CM

## 2022-03-28 DIAGNOSIS — E66.01 SEVERE OBESITY (HCC): ICD-10-CM

## 2022-03-28 DIAGNOSIS — I48.91 ATRIAL FIBRILLATION WITH RVR (HCC): ICD-10-CM

## 2022-03-28 DIAGNOSIS — J96.01 ACUTE RESPIRATORY FAILURE WITH HYPOXIA (HCC): ICD-10-CM

## 2022-03-28 DIAGNOSIS — J18.9 COMMUNITY ACQUIRED PNEUMONIA OF RIGHT LOWER LOBE OF LUNG: Primary | ICD-10-CM

## 2022-03-28 DIAGNOSIS — R09.02 HYPOXIA: ICD-10-CM

## 2022-03-28 LAB
ALBUMIN SERPL-MCNC: 3.3 G/DL (ref 3.5–5)
ALBUMIN/GLOB SERPL: 0.8 {RATIO} (ref 1.1–2.2)
ALP SERPL-CCNC: 74 U/L (ref 45–117)
ALT SERPL-CCNC: 42 U/L (ref 12–78)
ANION GAP SERPL CALC-SCNC: 13 MMOL/L (ref 5–15)
AST SERPL-CCNC: 41 U/L (ref 15–37)
BASOPHILS # BLD: 0.1 K/UL (ref 0–0.1)
BASOPHILS NFR BLD: 1 % (ref 0–1)
BILIRUB SERPL-MCNC: 1.2 MG/DL (ref 0.2–1)
BNP SERPL-MCNC: 3214 PG/ML (ref 0–450)
BUN SERPL-MCNC: 15 MG/DL (ref 6–20)
BUN/CREAT SERPL: 14 (ref 12–20)
CALCIUM SERPL-MCNC: 8.2 MG/DL (ref 8.5–10.1)
CHLORIDE SERPL-SCNC: 99 MMOL/L (ref 97–108)
CO2 SERPL-SCNC: 27 MMOL/L (ref 21–32)
COMMENT, HOLDF: NORMAL
COVID-19 RAPID TEST, COVR: NOT DETECTED
CREAT SERPL-MCNC: 1.05 MG/DL (ref 0.55–1.02)
DIFFERENTIAL METHOD BLD: ABNORMAL
EOSINOPHIL # BLD: 0.1 K/UL (ref 0–0.4)
EOSINOPHIL NFR BLD: 1 % (ref 0–7)
ERYTHROCYTE [DISTWIDTH] IN BLOOD BY AUTOMATED COUNT: 13.1 % (ref 11.5–14.5)
EST. AVERAGE GLUCOSE BLD GHB EST-MCNC: 123 MG/DL
GLOBULIN SER CALC-MCNC: 4.3 G/DL (ref 2–4)
GLUCOSE SERPL-MCNC: 174 MG/DL (ref 65–100)
HBA1C MFR BLD: 5.9 % (ref 4–5.6)
HCT VFR BLD AUTO: 37.6 % (ref 35–47)
HEMOCCULT STL QL: NEGATIVE
HGB BLD-MCNC: 12.3 G/DL (ref 11.5–16)
IMM GRANULOCYTES # BLD AUTO: 0.2 K/UL (ref 0–0.04)
IMM GRANULOCYTES NFR BLD AUTO: 1 % (ref 0–0.5)
INR PPP: 3 (ref 0.9–1.1)
LACTATE SERPL-SCNC: 1.7 MMOL/L (ref 0.4–2)
LYMPHOCYTES # BLD: 1.3 K/UL (ref 0.8–3.5)
LYMPHOCYTES NFR BLD: 9 % (ref 12–49)
MAGNESIUM SERPL-MCNC: 1.5 MG/DL (ref 1.6–2.4)
MCH RBC QN AUTO: 31.6 PG (ref 26–34)
MCHC RBC AUTO-ENTMCNC: 32.7 G/DL (ref 30–36.5)
MCV RBC AUTO: 96.7 FL (ref 80–99)
MONOCYTES # BLD: 0.8 K/UL (ref 0–1)
MONOCYTES NFR BLD: 6 % (ref 5–13)
NEUTS SEG # BLD: 12.1 K/UL (ref 1.8–8)
NEUTS SEG NFR BLD: 83 % (ref 32–75)
NRBC # BLD: 0 K/UL (ref 0–0.01)
NRBC BLD-RTO: 0 PER 100 WBC
PLATELET # BLD AUTO: 211 K/UL (ref 150–400)
PMV BLD AUTO: 10.6 FL (ref 8.9–12.9)
POTASSIUM SERPL-SCNC: 3.3 MMOL/L (ref 3.5–5.1)
PROT SERPL-MCNC: 7.6 G/DL (ref 6.4–8.2)
PROTHROMBIN TIME: 29.2 SEC (ref 9–11.1)
RBC # BLD AUTO: 3.89 M/UL (ref 3.8–5.2)
SAMPLES BEING HELD,HOLD: NORMAL
SODIUM SERPL-SCNC: 139 MMOL/L (ref 136–145)
SOURCE, COVRS: NORMAL
TROPONIN-HIGH SENSITIVITY: 64 NG/L (ref 0–51)
TROPONIN-HIGH SENSITIVITY: 65 NG/L (ref 0–51)
WBC # BLD AUTO: 14.6 K/UL (ref 3.6–11)

## 2022-03-28 PROCEDURE — 74011250637 HC RX REV CODE- 250/637: Performed by: STUDENT IN AN ORGANIZED HEALTH CARE EDUCATION/TRAINING PROGRAM

## 2022-03-28 PROCEDURE — 74011000250 HC RX REV CODE- 250: Performed by: STUDENT IN AN ORGANIZED HEALTH CARE EDUCATION/TRAINING PROGRAM

## 2022-03-28 PROCEDURE — 82272 OCCULT BLD FECES 1-3 TESTS: CPT

## 2022-03-28 PROCEDURE — 83036 HEMOGLOBIN GLYCOSYLATED A1C: CPT

## 2022-03-28 PROCEDURE — 96374 THER/PROPH/DIAG INJ IV PUSH: CPT

## 2022-03-28 PROCEDURE — 94761 N-INVAS EAR/PLS OXIMETRY MLT: CPT

## 2022-03-28 PROCEDURE — 83605 ASSAY OF LACTIC ACID: CPT

## 2022-03-28 PROCEDURE — 74011000258 HC RX REV CODE- 258: Performed by: STUDENT IN AN ORGANIZED HEALTH CARE EDUCATION/TRAINING PROGRAM

## 2022-03-28 PROCEDURE — 36415 COLL VENOUS BLD VENIPUNCTURE: CPT

## 2022-03-28 PROCEDURE — 80053 COMPREHEN METABOLIC PANEL: CPT

## 2022-03-28 PROCEDURE — 85610 PROTHROMBIN TIME: CPT

## 2022-03-28 PROCEDURE — 74011250636 HC RX REV CODE- 250/636: Performed by: STUDENT IN AN ORGANIZED HEALTH CARE EDUCATION/TRAINING PROGRAM

## 2022-03-28 PROCEDURE — 94664 DEMO&/EVAL PT USE INHALER: CPT

## 2022-03-28 PROCEDURE — 93005 ELECTROCARDIOGRAM TRACING: CPT

## 2022-03-28 PROCEDURE — 83735 ASSAY OF MAGNESIUM: CPT

## 2022-03-28 PROCEDURE — 99285 EMERGENCY DEPT VISIT HI MDM: CPT

## 2022-03-28 PROCEDURE — 77010033678 HC OXYGEN DAILY

## 2022-03-28 PROCEDURE — 87040 BLOOD CULTURE FOR BACTERIA: CPT

## 2022-03-28 PROCEDURE — 85025 COMPLETE CBC W/AUTO DIFF WBC: CPT

## 2022-03-28 PROCEDURE — 83880 ASSAY OF NATRIURETIC PEPTIDE: CPT

## 2022-03-28 PROCEDURE — 65660000000 HC RM CCU STEPDOWN

## 2022-03-28 PROCEDURE — 87635 SARS-COV-2 COVID-19 AMP PRB: CPT

## 2022-03-28 PROCEDURE — 99223 1ST HOSP IP/OBS HIGH 75: CPT | Performed by: FAMILY MEDICINE

## 2022-03-28 PROCEDURE — 94640 AIRWAY INHALATION TREATMENT: CPT

## 2022-03-28 PROCEDURE — 84484 ASSAY OF TROPONIN QUANT: CPT

## 2022-03-28 PROCEDURE — 77030029684 HC NEB SM VOL KT MONA -A

## 2022-03-28 PROCEDURE — 71045 X-RAY EXAM CHEST 1 VIEW: CPT

## 2022-03-28 RX ORDER — PRAVASTATIN SODIUM 20 MG/1
20 TABLET ORAL
Status: DISCONTINUED | OUTPATIENT
Start: 2022-03-28 | End: 2022-03-29 | Stop reason: HOSPADM

## 2022-03-28 RX ORDER — WARFARIN SODIUM 5 MG/1
2.5 TABLET ORAL
COMMUNITY
End: 2022-03-28

## 2022-03-28 RX ORDER — MAGNESIUM SULFATE HEPTAHYDRATE 40 MG/ML
2 INJECTION, SOLUTION INTRAVENOUS ONCE
Status: COMPLETED | OUTPATIENT
Start: 2022-03-28 | End: 2022-03-28

## 2022-03-28 RX ORDER — SODIUM CHLORIDE 0.9 % (FLUSH) 0.9 %
5-40 SYRINGE (ML) INJECTION EVERY 8 HOURS
Status: DISCONTINUED | OUTPATIENT
Start: 2022-03-28 | End: 2022-03-29 | Stop reason: HOSPADM

## 2022-03-28 RX ORDER — POLYETHYLENE GLYCOL 3350 17 G/17G
17 POWDER, FOR SOLUTION ORAL DAILY PRN
Status: DISCONTINUED | OUTPATIENT
Start: 2022-03-28 | End: 2022-03-29 | Stop reason: HOSPADM

## 2022-03-28 RX ORDER — GUAIFENESIN 600 MG/1
600 TABLET, EXTENDED RELEASE ORAL EVERY 12 HOURS
Status: DISCONTINUED | OUTPATIENT
Start: 2022-03-28 | End: 2022-03-29 | Stop reason: HOSPADM

## 2022-03-28 RX ORDER — METOPROLOL SUCCINATE 50 MG/1
100 TABLET, EXTENDED RELEASE ORAL DAILY
Status: DISCONTINUED | OUTPATIENT
Start: 2022-03-28 | End: 2022-03-29 | Stop reason: HOSPADM

## 2022-03-28 RX ORDER — PREDNISONE 20 MG/1
40 TABLET ORAL
Status: DISCONTINUED | OUTPATIENT
Start: 2022-03-29 | End: 2022-03-29 | Stop reason: HOSPADM

## 2022-03-28 RX ORDER — ACETAMINOPHEN 325 MG/1
650 TABLET ORAL
Status: DISCONTINUED | OUTPATIENT
Start: 2022-03-28 | End: 2022-03-29 | Stop reason: HOSPADM

## 2022-03-28 RX ORDER — DILTIAZEM HYDROCHLORIDE 5 MG/ML
0.25 INJECTION INTRAVENOUS ONCE
Status: COMPLETED | OUTPATIENT
Start: 2022-03-28 | End: 2022-03-28

## 2022-03-28 RX ORDER — CETIRIZINE HCL 10 MG
10 TABLET ORAL DAILY
Status: DISCONTINUED | OUTPATIENT
Start: 2022-03-28 | End: 2022-03-29 | Stop reason: HOSPADM

## 2022-03-28 RX ORDER — WARFARIN 2.5 MG/1
2.5 TABLET ORAL ONCE
Status: COMPLETED | OUTPATIENT
Start: 2022-03-28 | End: 2022-03-28

## 2022-03-28 RX ORDER — SODIUM CHLORIDE 0.9 % (FLUSH) 0.9 %
5-40 SYRINGE (ML) INJECTION AS NEEDED
Status: DISCONTINUED | OUTPATIENT
Start: 2022-03-28 | End: 2022-03-29 | Stop reason: HOSPADM

## 2022-03-28 RX ORDER — ONDANSETRON 2 MG/ML
4 INJECTION INTRAMUSCULAR; INTRAVENOUS
Status: COMPLETED | OUTPATIENT
Start: 2022-03-28 | End: 2022-03-28

## 2022-03-28 RX ORDER — CITALOPRAM 20 MG/1
20 TABLET, FILM COATED ORAL DAILY
Status: DISCONTINUED | OUTPATIENT
Start: 2022-03-29 | End: 2022-03-29 | Stop reason: HOSPADM

## 2022-03-28 RX ORDER — ACETAMINOPHEN 650 MG/1
650 SUPPOSITORY RECTAL
Status: DISCONTINUED | OUTPATIENT
Start: 2022-03-28 | End: 2022-03-29 | Stop reason: HOSPADM

## 2022-03-28 RX ORDER — ONDANSETRON 2 MG/ML
4 INJECTION INTRAMUSCULAR; INTRAVENOUS
Status: DISCONTINUED | OUTPATIENT
Start: 2022-03-28 | End: 2022-03-29 | Stop reason: HOSPADM

## 2022-03-28 RX ORDER — WARFARIN SODIUM 5 MG/1
5 TABLET ORAL
COMMUNITY

## 2022-03-28 RX ORDER — LOSARTAN POTASSIUM 50 MG/1
100 TABLET ORAL DAILY
Status: DISCONTINUED | OUTPATIENT
Start: 2022-03-29 | End: 2022-03-29 | Stop reason: HOSPADM

## 2022-03-28 RX ORDER — PROMETHAZINE HYDROCHLORIDE 25 MG/1
12.5 TABLET ORAL
Status: DISCONTINUED | OUTPATIENT
Start: 2022-03-28 | End: 2022-03-29 | Stop reason: HOSPADM

## 2022-03-28 RX ORDER — METOPROLOL SUCCINATE 50 MG/1
100 TABLET, EXTENDED RELEASE ORAL
COMMUNITY
End: 2022-04-11 | Stop reason: SDUPTHER

## 2022-03-28 RX ORDER — IPRATROPIUM BROMIDE AND ALBUTEROL SULFATE 2.5; .5 MG/3ML; MG/3ML
3 SOLUTION RESPIRATORY (INHALATION)
Status: DISCONTINUED | OUTPATIENT
Start: 2022-03-28 | End: 2022-03-29 | Stop reason: HOSPADM

## 2022-03-28 RX ADMIN — IPRATROPIUM BROMIDE AND ALBUTEROL SULFATE 3 ML: .5; 3 SOLUTION RESPIRATORY (INHALATION) at 12:09

## 2022-03-28 RX ADMIN — SODIUM CHLORIDE, PRESERVATIVE FREE 10 ML: 5 INJECTION INTRAVENOUS at 14:12

## 2022-03-28 RX ADMIN — CETIRIZINE HYDROCHLORIDE 10 MG: 10 TABLET, FILM COATED ORAL at 12:45

## 2022-03-28 RX ADMIN — SODIUM CHLORIDE 500 ML: 9 INJECTION, SOLUTION INTRAVENOUS at 06:45

## 2022-03-28 RX ADMIN — PRAVASTATIN SODIUM 20 MG: 20 TABLET ORAL at 23:01

## 2022-03-28 RX ADMIN — DILTIAZEM HYDROCHLORIDE 20 MG: 5 INJECTION INTRAVENOUS at 06:38

## 2022-03-28 RX ADMIN — METOPROLOL SUCCINATE 100 MG: 50 TABLET, EXTENDED RELEASE ORAL at 23:01

## 2022-03-28 RX ADMIN — MAGNESIUM SULFATE HEPTAHYDRATE 2 G: 40 INJECTION, SOLUTION INTRAVENOUS at 17:48

## 2022-03-28 RX ADMIN — SODIUM CHLORIDE 1 G: 9 INJECTION INTRAMUSCULAR; INTRAVENOUS; SUBCUTANEOUS at 12:48

## 2022-03-28 RX ADMIN — IPRATROPIUM BROMIDE AND ALBUTEROL SULFATE 3 ML: .5; 3 SOLUTION RESPIRATORY (INHALATION) at 20:39

## 2022-03-28 RX ADMIN — GUAIFENESIN 600 MG: 600 TABLET ORAL at 23:01

## 2022-03-28 RX ADMIN — AZITHROMYCIN MONOHYDRATE 500 MG: 500 INJECTION, POWDER, LYOPHILIZED, FOR SOLUTION INTRAVENOUS at 12:50

## 2022-03-28 RX ADMIN — POTASSIUM BICARBONATE 40 MEQ: 782 TABLET, EFFERVESCENT ORAL at 17:18

## 2022-03-28 RX ADMIN — WARFARIN SODIUM 2.5 MG: 2.5 TABLET ORAL at 17:18

## 2022-03-28 RX ADMIN — GUAIFENESIN 600 MG: 600 TABLET ORAL at 13:34

## 2022-03-28 RX ADMIN — SODIUM CHLORIDE, PRESERVATIVE FREE 10 ML: 5 INJECTION INTRAVENOUS at 23:00

## 2022-03-28 RX ADMIN — ONDANSETRON 4 MG: 2 INJECTION INTRAMUSCULAR; INTRAVENOUS at 14:19

## 2022-03-28 RX ADMIN — POTASSIUM BICARBONATE 40 MEQ: 782 TABLET, EFFERVESCENT ORAL at 12:45

## 2022-03-28 RX ADMIN — PIPERACILLIN AND TAZOBACTAM 3.38 G: 3; .375 INJECTION, POWDER, LYOPHILIZED, FOR SOLUTION INTRAVENOUS at 06:44

## 2022-03-28 RX ADMIN — ONDANSETRON 4 MG: 2 INJECTION INTRAMUSCULAR; INTRAVENOUS at 05:53

## 2022-03-28 RX ADMIN — METHYLPREDNISOLONE SODIUM SUCCINATE 125 MG: 125 INJECTION, POWDER, FOR SOLUTION INTRAMUSCULAR; INTRAVENOUS at 12:45

## 2022-03-28 NOTE — PROGRESS NOTES
[facility] Dosing Services: 3/28/22    Consult for Warfarin Dosing by Pharmacy by Dr. Carlene Meeks provided for this 77 y.o.  female , for indication of Atrial Fibrillation. Resume from home. PTA dose:  5mg Mon-Fri, none on Sat-Sun  Dose to achieve an INR goal of 2-3    Order entered for  Warfarin  2.5 (mg) ordered to be given today at 18:00.   - reduced dose 50% for illness and use of azithromycin    Significant drug interactions: Azithromycin  Previous dose given 5mg (PTA on 3/25/22)   PT/INR Lab Results   Component Value Date/Time    INR 3.0 (H) 03/28/2022 05:28 AM      Platelets Lab Results   Component Value Date/Time    PLATELET 056 13/20/7499 05:28 AM      H/H Lab Results   Component Value Date/Time    HGB 12.3 03/28/2022 05:28 AM        Pharmacy to follow daily and will provide subsequent Warfarin dosing based on clinical status. FORTUNATO Farah)  [place] [address] [id number] [phone]

## 2022-03-28 NOTE — H&P
Admission H&P     Name: Bina Zavala MRN: [id number]    Sex: Female   YOB: 1945  Age: 77 y.o. PCP: Carolina Hitchcock MD      Source of Information: patient, medical records    Chief complaint: SoB    History of Present Illness  Bina Zavala is a 77 y.o. female with PMHx of Afib on Warfarin, HTN, HLD, depression and obesity who presents to the ED complaining of worsening SoB. Pt endorses worsening SoB over the last 36hrs which she originally attributed to normal seasonal allergies. However her breathing got worsen even with rest. She tried using her at home IS but was getting lower numbers than her normal. Endorses productive brown sputum, nasal congestion, nausea, dry heaving, HAs. She has been drinking lots of water to try and help with symptoms. Denies ear pain or hearing difficulties  Pt also endorses mild L lower abd pain which has been consistent with her past intermittent diverticulosis discomfort. She has had black stools for the last day and a FOBT was neg in the ED. Meds attempted; Mucinex sinus PM, Tylenol, albuterol    COVID Questions: Vaccinated x3, booster 10/2021    In the ED:  Vitals: Temp 97.6   -91   /127-109/74   RR 35   SatO2  88% on RA  Labs: WBC 14.6, INR 3.0, K 3.3, BNP 3,214, Trop 65  Imaging: CXR- Patchy R-sided airspace disease. Treatment: NS 500cc, Zosyn, Zofran, Dilt 20mg IV push    EKG: Afib with RVR, nonischemic, qtc 428    Review of Systems  Review of Systems   Constitutional: Negative for chills, fatigue and fever. HENT: Positive for congestion, postnasal drip and rhinorrhea. Negative for sinus pressure and sore throat. Eyes: Negative for pain and visual disturbance. Respiratory: Positive for cough, shortness of breath and wheezing. Cardiovascular: Positive for leg swelling. Negative for chest pain and palpitations. Gastrointestinal: Positive for abdominal pain and nausea. Negative for constipation, diarrhea and vomiting. Endocrine: Negative for polydipsia and polyuria. Genitourinary: Negative for dysuria, frequency and hematuria. Musculoskeletal: Negative for arthralgias and myalgias. Skin: Negative for rash and wound. Allergic/Immunologic: Negative for immunocompromised state. Neurological: Positive for headaches. Negative for dizziness and weakness. Psychiatric/Behavioral: Negative for agitation. The patient is not nervous/anxious. Home Medications  Prior to Admission medications    Medication Sig Start Date End Date Taking? Authorizing Provider   warfarin (COUMADIN) 5 mg tablet Take 5 mg by mouth six (6) days a week. Mon-Sat   Yes Provider, Historical   warfarin (COUMADIN) 5 mg tablet Take 2.5 mg by mouth every Sunday. Yes Provider, Historical   losartan (COZAAR) 100 mg tablet Take 1 tablet by mouth once daily 3/11/22  Yes Natacha Kraus MD   albuterol (ProAir HFA) 90 mcg/actuation inhaler Take 2 Puffs by inhalation every six (6) hours as needed for Wheezing. 12/13/21  Yes Natacha Kraus MD   hydroCHLOROthiazide (HYDRODIURIL) 25 mg tablet Take 1 Tablet by mouth daily. 11/8/21  Yes Natacha Kraus MD   metoprolol succinate (TOPROL-XL) 50 mg XL tablet Take 2 Tablets by mouth daily. 11/8/21  Yes Natacha Kraus MD   pravastatin (PRAVACHOL) 20 mg tablet Take 1 tablet by mouth nightly 5/3/21  Yes Natacha Kraus MD   citalopram (CELEXA) 20 mg tablet Take 1 Tab by mouth daily. 5/3/21  Yes Natacha Kraus MD   cholecalciferol, vitamin D3, (VITAMIN D3 PO) Take 1 Tablet by mouth daily. Yes Provider, Historical   warfarin (Coumadin) 5 mg tablet Take 1 Tablet by mouth daily. Indications: treatment to prevent blood clots in chronic atrial fibrillation 2/4/22 3/28/22  Constance Parker MD   cefUROXime (CEFTIN) 500 mg tablet Take 500 mg by mouth two (2) times a day.   3/28/22  Provider, Historical       Allergies  No Known Allergies    Past Medical History  Past Medical History:   Diagnosis Date    Arthritis     Asthma     Atrial [address] [address]     Afib discovered 8/21 - asymptomatic     Calculus of kidney     Congestive heart failure ([facility] [address]     Depression with anxiety     Hypertension        Past Surgical History  Past Surgical History:   Procedure Laterality Date    HX HYSTERECTOMY  1990    For fibroids       Family History  Family History   Problem Relation Age of Onset    Cancer Mother     Breast Cancer Mother     Stroke Father     Heart Disease Father     Breast Cancer Niece     Uterine Cancer Sister        Social History  Alcohol history: \"too much, but not every day. \" Two gin/tonic   3x/wk  Smoking history: Never smoker  Illicit drug history: Not at all  Living arrangement: patient Betty Haas, friend. Ambulates: Completely independent, but lately has been more cautious with the [employer] and AC    Vital Signs  Visit Vitals  BP (!) 131/54   Pulse 99   Temp 97.8 °F (36.6 °C)   Resp 21   Ht 4' 11\" (1.499 m)   Wt 182 lb (82.6 kg)   SpO2 95%   BMI 36.76 kg/m²       Physical Exam  General: No acute distress. Alert. Cooperative. Head: Normocephalic. Atraumatic. Eyes:              Conjunctiva pink. Sclera white. PERRL. Ears:              Hearing grossly intact. Nose:             Septum midline. Mucosa pink. No drainage. Throat: Mucosa pink. Moist mucous membranes. No tonsillar exudates or erythema. Palate movement equal bilaterally. Neck: Supple. Normal ROM. No stiffness. Respiratory: Low air movement, diffuse faint wheezing, RLL crackles. Cardiovascular: Tachycardia, irregularly irregular. No m/r/g. Pulses 2+ throughout. GI: + bowel sounds. Nontender. No rebound tenderness or guarding. Nondistended. Extremities: Trace LE edema to mid-shin   Musculoskeletal: Full ROM in all extremities. Skin: Warm, dry. No rashes. Neuro: CN II-XII grossly intact. Strength 5/5 in all extremities.         Laboratory Data  Recent Results (from the past 8 hour(s))   CBC WITH AUTOMATED DIFF Collection Time: 03/28/22  5:28 AM   Result Value Ref Range    WBC 14.6 (H) 3.6 - 11.0 K/uL    RBC 3.89 3.80 - 5.20 M/uL    HGB 12.3 11.5 - 16.0 g/dL    HCT 37.6 35.0 - 47.0 %    MCV 96.7 80.0 - 99.0 FL    MCH 31.6 26.0 - 34.0 PG    MCHC 32.7 30.0 - 36.5 g/dL    RDW 13.1 11.5 - 14.5 %    PLATELET 012 613 - 631 K/uL    MPV 10.6 8.9 - 12.9 FL    NRBC 0.0 0.0  WBC    ABSOLUTE NRBC 0.00 0.00 - 0.01 K/uL    NEUTROPHILS 83 (H) 32 - 75 %    LYMPHOCYTES 9 (L) 12 - 49 %    MONOCYTES 6 5 - 13 %    EOSINOPHILS 1 0 - 7 %    BASOPHILS 1 0 - 1 %    IMMATURE GRANULOCYTES 1 (H) 0 - 0.5 %    ABS. NEUTROPHILS 12.1 (H) 1.8 - 8.0 K/UL    ABS. LYMPHOCYTES 1.3 0.8 - 3.5 K/UL    ABS. MONOCYTES 0.8 0.0 - 1.0 K/UL    ABS. EOSINOPHILS 0.1 0.0 - 0.4 K/UL    ABS. BASOPHILS 0.1 0.0 - 0.1 K/UL    ABS. IMM. GRANS. 0.2 (H) 0.00 - 0.04 K/UL    DF AUTOMATED     PROTHROMBIN TIME + INR    Collection Time: 03/28/22  5:28 AM   Result Value Ref Range    INR 3.0 (H) 0.9 - 1.1      Prothrombin time 29.2 (H) 9.0 - 36.5 sec   METABOLIC PANEL, COMPREHENSIVE    Collection Time: 03/28/22  5:28 AM   Result Value Ref Range    Sodium 139 136 - 145 mmol/L    Potassium 3.3 (L) 3.5 - 5.1 mmol/L    Chloride 99 97 - 108 mmol/L    CO2 27 21 - 32 mmol/L    Anion gap 13 5 - 15 mmol/L    Glucose 174 (H) 65 - 100 mg/dL    BUN 15 6 - 20 MG/DL    Creatinine 1.05 (H) 0.55 - 1.02 MG/DL    BUN/Creatinine ratio 14 12 - 20      GFR est AA >60 >60 ml/min/1.73m2    GFR est non-AA 51 (L) >60 ml/min/1.73m2    Calcium 8.2 (L) 8.5 - 10.1 MG/DL    Bilirubin, total 1.2 (H) 0.2 - 1.0 MG/DL    ALT (SGPT) 42 12 - 78 U/L    AST (SGOT) 41 (H) 15 - 37 U/L    Alk.  phosphatase 74 45 - 117 U/L    Protein, total 7.6 6.4 - 8.2 g/dL    Albumin 3.3 (L) 3.5 - 5.0 g/dL    Globulin 4.3 (H) 2.0 - 4.0 g/dL    A-G Ratio 0.8 (L) 1.1 - 2.2     NT-PRO BNP    Collection Time: 03/28/22  5:28 AM   Result Value Ref Range    NT pro-BNP 3,214 (H) 0 - 450 PG/ML   TROPONIN-HIGH SENSITIVITY    Collection Time: 03/28/22  5:28 AM   Result Value Ref Range    Troponin-High Sensitivity 65 (HH) 0 - 51 ng/L   OCCULT BLOOD, STOOL    Collection Time: 03/28/22  5:28 AM   Result Value Ref Range    Occult blood, stool Negative     LACTIC ACID    Collection Time: 03/28/22  6:23 AM   Result Value Ref Range    Lactic acid 1.7 0.4 - 2.0 MMOL/L       Imaging  CXR Results  (Last 48 hours)               03/28/22 0547  XR CHEST PORT Final result    Impression:  Patchy right-sided airspace disease. Follow-up to complete resolution in 6-8   weeks. Narrative:  INDICATION: eval for airspace dx       EXAM:  AP CHEST RADIOGRAPH       COMPARISON: January 14, 2021       FINDINGS:       AP portable view of the chest demonstrates cardiomegaly. Patchy airspace disease   throughout the right lung. No pneumothorax. The osseous structures are   unremarkable. Assessment and Plan     Samira Zavala is a 77 y.o. female with a PMHx of Afib on Warfarin, HTN, HLD, depression and obesity who is admitted for Afib RVR in the setting of CAP. Afib with RVR- Follows with Dr. Irais Pedersen, onset ~8/2021. Previously well rate controlled outpatient. S/p Dilt 20mg IV push in ED. Minimal Trop elevated likely due to RVR stress, no ischemic signs. Elevated BNP on admission (no Bl) but normal Echo 9/2021  - Admit to telemetry  - Continue Toprol XL 100mg qhs  - Repeat trop  - Replete K, check Mag  - Pharm to dose warfarin    Community acquired pneumonia in the setting of RAD- R sided PNA, however no history of or concern for aspiration.   - Rapid Covid, Sputum culture  - Ceftriaxone and Azithromycin daily, estimated 7 day abx course  - Supplemental O2 to maintain sats >92%  - Duonebs Q6H  - Zyrtec 10mg daily  - Solumedrol 125mg now and then Prednisone 40mg x 5 days starting tomorrow    HTN- Previously stable on home regiment outpatient.    - Continue home losartan 100mg daily  - Hold home HCTZ 25mg for now as BPs borderline low, restart if needed    HLD-  (12/20/21)  - Continue home Pravastatin 20mg QHS    Depression-   - Continue home Celexa 20mg daily    Elevated fasting glucose- No history of DM or A1c in chart.  on arrival. Expect to increase with steroid use inpatient  - A1c now  - Monitor daily fasting BG    Obesity-  - The pt's Body mass index is 36.76 kg/m². - Encouraging lifestyle modifications and further follow up outpatient. FEN/GI - Regular diet. No IVF at this time  Activity - Ambulate as tolerated  DVT prophylaxis - Warfarin  GI prophylaxis - Not indicated at this time  Fall prophylaxis - Not indicated at this time. Disposition - Admit to Telemetry. Plan to d/c to TBD. Consulting PT, OT and CM  Code Status - DNR. Discussed with patient / caregivers. Next of Kin Name and Contact - Valerie Khan,  [place] - [phone]    Patient Daryll Lesches Dermer will be discussed with Dr. Primitivo Mosley.     8:21 AM, 03/28/22  Ralph Osuna MD  Family Medicine Resident       For Billing    Chief Complaint   Patient presents with   [address] Problems  Date Reviewed: 9/28/2021          Codes Class Noted POA    CAP (community acquired pneumonia) ICD-10-CM: J18.9  ICD-9-CM: 222  3/28/2022 Unknown        Atrial fibrillation with RVR ([facility] [address] ICD-10-CM: I48.91  ICD-9-CM: 427.31  3/28/2022 Unknown        Acute respiratory failure with hypoxia [facility]) ICD-10-CM: J96.01  ICD-9-CM: 518.81  3/28/2022 Unknown

## 2022-03-28 NOTE — PROGRESS NOTES
Reason for Admission:  Community Acquired Pneumonia                   RUR Score:  7%                   Plan for utilizing home health:   TBD - per PT/OT radhika     PCP: First and Last name:  Dejan Munroe MD   Name of Practice: [address] [place], [place] [id number]   Are you a current patient: Yes/No: yes   Approximate date of last visit: a year ago   Can you participate in a virtual visit with your PCP: yes                    Current Advanced Directive/Advance Care Plan: DNR    Healthcare Decision Maker:            Primary Decision Maker: Moise Bill - Child - [phone]    Primary Decision Maker: Apolonia Scherer - Daughter                   Transition of Care Plan:                    Initial assessment was completed with the patient at bedside in the ED. Charted address and contact information were verified. Patient lives with her friend, Petra Mars, in a one story apartment with no entry steps. Patient reports that she was independent with ADLs and ambulation and driving prior to admission. She has no prior home health history and does not own any DME. Patient's son lives locally and her daughter is in [place]. Patient's preferred pharmacy is [facility] in [place]. 1. CM to follow and assist with any d/c needs  2. Await further evaluation and response to treatment  3. PT/OT consulted  4. Home with family when stable  5. Outpatient follow-up  6. Family or friend will transport at discharge    Care Management Interventions  PCP Verified by CM:  Yes  Transition of Care Consult (CM Consult): Discharge Planning  Physical Therapy Consult: Yes  Occupational Therapy Consult: Yes  Support Systems: Child(giselle),Friend/Neighbor  Confirm Follow Up Transport: Family  The Plan for Transition of Care is Related to the Following Treatment Goals : Community Acquired Pneumonia  Discharge Location  Patient Expects to be Discharged to[de-identified] Home with family assistance     Abner Dickey

## 2022-03-28 NOTE — ED TRIAGE NOTES
Patient arrived at EMS ambulance entrance via POV. Standing while reporting she could not breath or walk. Escorted to room via wheelchair when patient report N/V/D with dark loose stools. Does take warfarin.

## 2022-03-28 NOTE — ED PROVIDER NOTES
Patient is a 80-year-old female present emergency department with shortness of breath, cough. Patient states that she was at home where she had been having cough, congestion productive sputum that she describes as a brown color started to get progressively worse with the shortness of breath so she drives to the emergency department extremely dyspneic. Patient is also been having nausea vomiting and diarrhea with dark loose stools. Patient is on warfarin for A. fib on arrival patient was noted to be tachycardic with a rate of 120 respiratory rate of 33 O2 sats on arrival 80% on room air. Patient immediately placed on 2 L nasal cannula. Patient states that she has been using her albuterol inhaler at home with little to no relief. Past Medical History:   Diagnosis Date    Arthritis     Asthma     Atrial fibrillation ([facility] [address]     Afib discovered 8/21 - asymptomatic     Calculus of kidney     Congestive heart failure (HCC)     Depression with anxiety     Hypertension        History reviewed. No pertinent surgical history. Family History:   Problem Relation Age of Onset    Cancer Mother     Breast Cancer Mother     Stroke Father     Heart Disease Father     Breast Cancer Niece     Uterine Cancer Sister        Social History     Socioeconomic History    Marital status:      Spouse name: Not on file    Number of children: Not on file    Years of education: Not on file    Highest education level: Not on file   Occupational History    Not on file   Tobacco Use    Smoking status: Never Smoker    Smokeless tobacco: Never Used   Vaping Use    Vaping Use: Never used   Substance and Sexual Activity    Alcohol use:  Yes    Drug use: Never    Sexual activity: Not Currently   Other Topics Concern    Not on file   Social History Narrative    Not on file     Social Determinants of Health     Financial Resource Strain:     Difficulty of Paying Living Expenses: Not on file   Food Insecurity:     Worried About Running Out of Food in the Last Year: Not on file    Izabella of Food in the Last Year: Not on file   Transportation Needs:     Lack of Transportation (Medical): Not on file    Lack of Transportation (Non-Medical): Not on file   Physical Activity:     Days of Exercise per Week: Not on file    Minutes of Exercise per Session: Not on file   Stress:     Feeling of Stress : Not on file   Social Connections:     Frequency of Communication with Friends and Family: Not on file    Frequency of Social Gatherings with Friends and Family: Not on file    Attends Voodoo Services: Not on file    Active Member of [address] [place] [address] [employer] or Organizations: Not on file    Attends Club or Organization Meetings: Not on file    Marital Status: Not on file   Intimate Partner Violence:     Fear of Current or Ex-Partner: Not on file    Emotionally Abused: Not on file    Physically Abused: Not on file    Sexually Abused: Not on file   Housing Stability:     Unable to Pay for Housing in the Last Year: Not on file    Number of [place] in the Last Year: Not on file    Unstable Housing in the Last Year: Not on file         ALLERGIES: Patient has no known allergies. Review of Systems   Constitutional: Positive for activity change. HENT: Positive for congestion. Respiratory: Positive for chest tightness, shortness of breath and wheezing. Gastrointestinal: Positive for diarrhea, nausea and vomiting. All other systems reviewed and are negative. Vitals:    03/28/22 0455 03/28/22 0505 03/28/22 0512 03/28/22 0528   BP: (!) 173/127 (!) 184/136     Pulse: (!) 133 (!) 157 (!) 120    Resp: 20 (!) 35 (!) 33    Temp: 97.6 °F (36.4 °C)      SpO2: 92%  (!) 88% (!) 88%            Physical Exam  Vitals and nursing note reviewed. Constitutional:       Appearance: She is ill-appearing. HENT:      Head: Normocephalic and atraumatic. Nose: Congestion present.    Eyes:      Extraocular Movements: Extraocular movements intact. Pupils: Pupils are equal, round, and reactive to light. Cardiovascular:      Rate and Rhythm: Tachycardia present. Rhythm irregular. Pulses: Normal pulses. Heart sounds: Normal heart sounds. Pulmonary:      Effort: Tachypnea and respiratory distress present. Breath sounds: Examination of the right-upper field reveals wheezing. Examination of the left-upper field reveals wheezing. Examination of the right-middle field reveals decreased breath sounds. Examination of the right-lower field reveals decreased breath sounds. Decreased breath sounds and wheezing present. Abdominal:      General: Abdomen is protuberant. There is distension. Tenderness: There is abdominal tenderness. Genitourinary:     Rectum: Guaiac result negative. Musculoskeletal:         General: Normal range of motion. Skin:     General: Skin is warm. Neurological:      General: No focal deficit present. Mental Status: She is oriented to person, place, and time. Psychiatric:         Mood and Affect: Mood is anxious. MDM  Number of Diagnoses or Management Options  Atrial fibrillation with RVR ([address]  Community acquired pneumonia of right lower lobe of lung  Hypoxia  Diagnosis management comments: A/P: Reactive airway disease, airspace disease, A. fib with RVR. 81-year-old female present emergency department with cough, congestion increased work of breathing room air sats 80% placed on 2 L nasal cannula with improvement symptoms patient remained with A. fib with RVR. Likely precipitated by recent URI-like illness. Due to hypoxia, A. fib with RVR chest x-ray showing airspace disease patient require admission for further management. Procedures      Perfect Serve Consult for Admission  6:30 AM    ED Room Number: WER15/15  Patient Name and age:  Juany Zavala 77 y.o.  female  Working Diagnosis:   1. Community acquired pneumonia of right lower lobe of lung    2. Atrial fibrillation with RVR ([address]    3. Hypoxia        COVID-19 Suspicion:  no  Sepsis present:  yes  Reassessment needed: yes  Code Status:  Full Code  Readmission: no  Isolation Requirements:  no  Recommended Level of Care:  step down  Department:[facility] ED - [phone]  Other: Total critical care time spent exclusive of procedures:  57 min.

## 2022-03-28 NOTE — ED NOTES
SERGEY Cabral called the assigned phone number for Dr Ciera Nixon to try to receive a call back to get a bed status change, per supervisor's request.

## 2022-03-28 NOTE — ED NOTES
Spoke to nursing supervisor about this patient. She advised there are no step down beds or telemetry beds at this time. Nursing supervisor encountered if patient's status could be downgraded from step down bed.

## 2022-03-28 NOTE — ED NOTES
Dr Bar Breath called a second time per assigned phone number [phone]. The Nursing Supervisor wants to know if the patient status could be down graded to possibly get bed placement; otherwise they will need to go to the ED as a boarder. First call not returned.

## 2022-03-28 NOTE — ED NOTES
Nursing Supervisor contacted that we have left 2 messages for Dr Naif Diane about the bed placement issue, with no return call back.  She will follow up now and try to get a response

## 2022-03-28 NOTE — PROGRESS NOTES
Bedside and Verbal shift change report given to Tatiana Zee (oncoming nurse) by Saniya Gil (offgoing nurse). Report included the following information SBAR, Kardex, MAR, Accordion and Recent Results.

## 2022-03-28 NOTE — ED NOTES
Patient assisted on bedpan. Patient had small BM and voided. Patient cleansed and new brief applied.

## 2022-03-28 NOTE — PROGRESS NOTES
TRANSFER - IN REPORT:    Verbal report received from Kathi Smith (name) on Zenon Zavala (patient name) being transferred to  (unit) for routine progression of care   Report consisted of patients Situation, Background, Assessment and   Recommendations(SBAR). Transition of Care Plan from Kathi Smith (name)    RUR 7% (Score %) low   Is This a Readmission NO  Is this a Bundle NO    1. PT/OT consults  2. Home with family when medically stable  3. Family or friend to transport home at d/c  L. Sophie Lund

## 2022-03-28 NOTE — ED NOTES
Patient admitted to family practice service by Dr. Mc Mcwilliams  No beds, will be a telemetry hold. Nursing supervisor/hospitalist requesting ED to ED. I discussed with Dr. Carroll Celestin in the ED who accepts the patient.      Eliot John, DO

## 2022-03-28 NOTE — ED NOTES
Per Hospitalist patient needs to be at least telemetry. So we need to do an er to er transfer and wait on bed.

## 2022-03-28 NOTE — ED NOTES
Patient arrives to [facility] ED from [facility]. Patient denies any pain. Some shortness of breath. Resting comfortably in bed. Call bell within reach.

## 2022-03-29 ENCOUNTER — TELEPHONE (OUTPATIENT)
Dept: CARDIOLOGY CLINIC | Age: 77
End: 2022-03-29

## 2022-03-29 ENCOUNTER — HOME HEALTH ADMISSION (OUTPATIENT)
Dept: HOME HEALTH SERVICES | Facility: HOME HEALTH | Age: 77
End: 2022-03-29
Payer: MEDICARE

## 2022-03-29 ENCOUNTER — TELEPHONE (OUTPATIENT)
Dept: FAMILY MEDICINE CLINIC | Age: 77
End: 2022-03-29

## 2022-03-29 VITALS
DIASTOLIC BLOOD PRESSURE: 81 MMHG | HEART RATE: 94 BPM | HEIGHT: 59 IN | RESPIRATION RATE: 18 BRPM | SYSTOLIC BLOOD PRESSURE: 117 MMHG | OXYGEN SATURATION: 98 % | TEMPERATURE: 97.5 F | WEIGHT: 182 LBS | BODY MASS INDEX: 36.69 KG/M2

## 2022-03-29 LAB
ANION GAP SERPL CALC-SCNC: 6 MMOL/L (ref 5–15)
BASOPHILS # BLD: 0 K/UL (ref 0–0.1)
BASOPHILS NFR BLD: 0 % (ref 0–1)
BUN SERPL-MCNC: 18 MG/DL (ref 6–20)
BUN/CREAT SERPL: 22 (ref 12–20)
CALCIUM SERPL-MCNC: 8.4 MG/DL (ref 8.5–10.1)
CALCULATED R AXIS, ECG10: 79 DEGREES
CALCULATED T AXIS, ECG11: 63 DEGREES
CHLORIDE SERPL-SCNC: 101 MMOL/L (ref 97–108)
CO2 SERPL-SCNC: 31 MMOL/L (ref 21–32)
CREAT SERPL-MCNC: 0.82 MG/DL (ref 0.55–1.02)
DIAGNOSIS, 93000: NORMAL
DIFFERENTIAL METHOD BLD: ABNORMAL
EOSINOPHIL # BLD: 0 K/UL (ref 0–0.4)
EOSINOPHIL NFR BLD: 0 % (ref 0–7)
ERYTHROCYTE [DISTWIDTH] IN BLOOD BY AUTOMATED COUNT: 13.2 % (ref 11.5–14.5)
GLUCOSE SERPL-MCNC: 170 MG/DL (ref 65–100)
HCT VFR BLD AUTO: 33.4 % (ref 35–47)
HGB BLD-MCNC: 10.9 G/DL (ref 11.5–16)
IMM GRANULOCYTES # BLD AUTO: 0 K/UL
IMM GRANULOCYTES NFR BLD AUTO: 0 %
INR PPP: 2.4 (ref 0.9–1.1)
LYMPHOCYTES # BLD: 0.6 K/UL (ref 0.8–3.5)
LYMPHOCYTES NFR BLD: 5 % (ref 12–49)
MAGNESIUM SERPL-MCNC: 2.7 MG/DL (ref 1.6–2.4)
MCH RBC QN AUTO: 32.6 PG (ref 26–34)
MCHC RBC AUTO-ENTMCNC: 32.6 G/DL (ref 30–36.5)
MCV RBC AUTO: 100 FL (ref 80–99)
MONOCYTES # BLD: 0.2 K/UL (ref 0–1)
MONOCYTES NFR BLD: 2 % (ref 5–13)
NEUTS BAND NFR BLD MANUAL: 1 % (ref 0–6)
NEUTS SEG # BLD: 10.5 K/UL (ref 1.8–8)
NEUTS SEG NFR BLD: 92 % (ref 32–75)
NRBC # BLD: 0 K/UL (ref 0–0.01)
NRBC BLD-RTO: 0 PER 100 WBC
PLATELET # BLD AUTO: 191 K/UL (ref 150–400)
PMV BLD AUTO: 11.2 FL (ref 8.9–12.9)
POTASSIUM SERPL-SCNC: 4 MMOL/L (ref 3.5–5.1)
PROTHROMBIN TIME: 24 SEC (ref 9–11.1)
Q-T INTERVAL, ECG07: 298 MS
QRS DURATION, ECG06: 70 MS
QTC CALCULATION (BEZET), ECG08: 428 MS
RBC # BLD AUTO: 3.34 M/UL (ref 3.8–5.2)
RBC MORPH BLD: ABNORMAL
SODIUM SERPL-SCNC: 138 MMOL/L (ref 136–145)
VENTRICULAR RATE, ECG03: 124 BPM
WBC # BLD AUTO: 11.3 K/UL (ref 3.6–11)

## 2022-03-29 PROCEDURE — 74011000250 HC RX REV CODE- 250: Performed by: STUDENT IN AN ORGANIZED HEALTH CARE EDUCATION/TRAINING PROGRAM

## 2022-03-29 PROCEDURE — 83735 ASSAY OF MAGNESIUM: CPT

## 2022-03-29 PROCEDURE — 97162 PT EVAL MOD COMPLEX 30 MIN: CPT

## 2022-03-29 PROCEDURE — 74011250637 HC RX REV CODE- 250/637: Performed by: STUDENT IN AN ORGANIZED HEALTH CARE EDUCATION/TRAINING PROGRAM

## 2022-03-29 PROCEDURE — 97535 SELF CARE MNGMENT TRAINING: CPT

## 2022-03-29 PROCEDURE — 94664 DEMO&/EVAL PT USE INHALER: CPT

## 2022-03-29 PROCEDURE — 80048 BASIC METABOLIC PNL TOTAL CA: CPT

## 2022-03-29 PROCEDURE — 94618 PULMONARY STRESS TESTING: CPT

## 2022-03-29 PROCEDURE — 77010033678 HC OXYGEN DAILY

## 2022-03-29 PROCEDURE — 36415 COLL VENOUS BLD VENIPUNCTURE: CPT

## 2022-03-29 PROCEDURE — 94640 AIRWAY INHALATION TREATMENT: CPT

## 2022-03-29 PROCEDURE — 74011250636 HC RX REV CODE- 250/636: Performed by: STUDENT IN AN ORGANIZED HEALTH CARE EDUCATION/TRAINING PROGRAM

## 2022-03-29 PROCEDURE — 87070 CULTURE OTHR SPECIMN AEROBIC: CPT

## 2022-03-29 PROCEDURE — 85610 PROTHROMBIN TIME: CPT

## 2022-03-29 PROCEDURE — 94761 N-INVAS EAR/PLS OXIMETRY MLT: CPT

## 2022-03-29 PROCEDURE — 85025 COMPLETE CBC W/AUTO DIFF WBC: CPT

## 2022-03-29 PROCEDURE — 97116 GAIT TRAINING THERAPY: CPT

## 2022-03-29 PROCEDURE — 97165 OT EVAL LOW COMPLEX 30 MIN: CPT

## 2022-03-29 PROCEDURE — 74011636637 HC RX REV CODE- 636/637: Performed by: STUDENT IN AN ORGANIZED HEALTH CARE EDUCATION/TRAINING PROGRAM

## 2022-03-29 PROCEDURE — 97530 THERAPEUTIC ACTIVITIES: CPT

## 2022-03-29 PROCEDURE — 99238 HOSP IP/OBS DSCHRG MGMT 30/<: CPT | Performed by: FAMILY MEDICINE

## 2022-03-29 RX ORDER — DOXYCYCLINE HYCLATE 100 MG
100 TABLET ORAL EVERY 12 HOURS
Qty: 10 TABLET | Refills: 0 | Status: SHIPPED | OUTPATIENT
Start: 2022-03-30 | End: 2022-04-04

## 2022-03-29 RX ORDER — PREDNISONE 20 MG/1
40 TABLET ORAL DAILY
Qty: 8 TABLET | Refills: 0 | Status: SHIPPED | OUTPATIENT
Start: 2022-03-30 | End: 2022-04-03

## 2022-03-29 RX ORDER — AMOXICILLIN AND CLAVULANATE POTASSIUM 875; 125 MG/1; MG/1
1 TABLET, FILM COATED ORAL EVERY 12 HOURS
Qty: 10 TABLET | Refills: 0 | Status: SHIPPED | OUTPATIENT
Start: 2022-03-30 | End: 2022-03-29

## 2022-03-29 RX ORDER — DOXYCYCLINE HYCLATE 100 MG
100 TABLET ORAL EVERY 12 HOURS
Status: DISCONTINUED | OUTPATIENT
Start: 2022-03-30 | End: 2022-03-29 | Stop reason: HOSPADM

## 2022-03-29 RX ORDER — AMOXICILLIN AND CLAVULANATE POTASSIUM 875; 125 MG/1; MG/1
1 TABLET, FILM COATED ORAL EVERY 12 HOURS
Status: DISCONTINUED | OUTPATIENT
Start: 2022-03-30 | End: 2022-03-29 | Stop reason: HOSPADM

## 2022-03-29 RX ORDER — AMOXICILLIN AND CLAVULANATE POTASSIUM 875; 125 MG/1; MG/1
1 TABLET, FILM COATED ORAL EVERY 12 HOURS
Qty: 10 TABLET | Refills: 0 | Status: SHIPPED | OUTPATIENT
Start: 2022-03-30 | End: 2022-04-04

## 2022-03-29 RX ORDER — DOXYCYCLINE HYCLATE 100 MG
100 TABLET ORAL EVERY 12 HOURS
Qty: 10 TABLET | Refills: 0 | Status: SHIPPED | OUTPATIENT
Start: 2022-03-30 | End: 2022-03-29

## 2022-03-29 RX ORDER — WARFARIN 2 MG/1
4 TABLET ORAL EVERY EVENING
Status: DISCONTINUED | OUTPATIENT
Start: 2022-03-29 | End: 2022-03-29 | Stop reason: HOSPADM

## 2022-03-29 RX ORDER — PREDNISONE 20 MG/1
40 TABLET ORAL DAILY
Qty: 8 TABLET | Refills: 0 | Status: SHIPPED | OUTPATIENT
Start: 2022-03-30 | End: 2022-03-29

## 2022-03-29 RX ADMIN — SODIUM CHLORIDE 1 G: 9 INJECTION INTRAMUSCULAR; INTRAVENOUS; SUBCUTANEOUS at 11:09

## 2022-03-29 RX ADMIN — IPRATROPIUM BROMIDE AND ALBUTEROL SULFATE 3 ML: .5; 3 SOLUTION RESPIRATORY (INHALATION) at 09:24

## 2022-03-29 RX ADMIN — GUAIFENESIN 600 MG: 600 TABLET ORAL at 08:18

## 2022-03-29 RX ADMIN — CITALOPRAM HYDROBROMIDE 20 MG: 20 TABLET ORAL at 08:18

## 2022-03-29 RX ADMIN — PREDNISONE 40 MG: 20 TABLET ORAL at 08:17

## 2022-03-29 RX ADMIN — AZITHROMYCIN MONOHYDRATE 500 MG: 500 INJECTION, POWDER, LYOPHILIZED, FOR SOLUTION INTRAVENOUS at 11:09

## 2022-03-29 RX ADMIN — CETIRIZINE HYDROCHLORIDE 10 MG: 10 TABLET, FILM COATED ORAL at 08:18

## 2022-03-29 RX ADMIN — LOSARTAN POTASSIUM 100 MG: 50 TABLET, FILM COATED ORAL at 08:18

## 2022-03-29 RX ADMIN — IPRATROPIUM BROMIDE AND ALBUTEROL SULFATE 3 ML: .5; 3 SOLUTION RESPIRATORY (INHALATION) at 14:12

## 2022-03-29 NOTE — TELEPHONE ENCOUNTER
R/t call to pt,  Per Dr. Norris Khan: \"If she is stable cardiac wise, and wants to push back her appt a month or two instead to give her time to recover from pneumonia, then that would be fine.  Up to her as long as she feels stable heart wise. \"  Future Appointments   Date Time Provider Kary Rowland   4/1/2022 10:00 AM Brenton Cabrera MD [facility] BS AMB   4/5/2022 11:30 [facility]   5/24/2022 11:00 AM Naman Rondon MD [facility] BS AMB

## 2022-03-29 NOTE — DISCHARGE SUMMARY
[address] [facility] [address] [place] [address]   Office [phone]  Fax [phone]       Discharge / Transfer / Off-Service Note     Name: Daryll Lesches Dermer MRN: [id number]  Sex: Female   YOB: 1945  Age: 77 y.o. PCP: Kaylene Leggett MD     Date of admission: 3/28/2022  Date of discharge/transfer: 3/30/2022    Attending physician at admission: Dr. Oleg Escobar    Attending physician at discharge/transfer: Primitivo Mosley DO     Resident physician at discharge/transfer: Shawanda Lombardo MD     Consultants during hospitalization  None     Admission diagnoses   CAP (community acquired pneumonia) [J18.9]  Atrial fibrillation with RVR ([address] [I48.91]  Acute respiratory failure with hypoxia ([address] [J96.01]    Recommended follow-up after discharge  1. PCP: Kaylene Leggett MD    Things to follow up on with PCP:  - Afib with RVR: recheck INR and adjust Warfarin as needed      History of Present Illness  Per admitting provider, Oralia Willis a 77 y. o. female with a PMHx of Afib on Warfarin, HTN, HLD, depression and obesity who presented to ED with SOB and was admitted for Afib RVR in the setting of CAP. HOSPITAL COURSE    Afib with RVR- Follows with Dr. Chasity Thomas, onset ~8/2021. Previously well rate controlled outpatient. Minimal Trop elevated (65>64) likely due to RVR stress, no ischemic signs. Elevated BNP on admission (no Bl) but normal Echo 9/2021. Patient's worsening Afib was thought to be due to being acutely ill so home metoprolol dose was not increased. On discharge, pt was rate controlled on home Metoprolol 100mg qhs and discharged without any change to home regimen.  - Continue Toprol XL 100mg qhs  - Follow up with cardiology and PCP  - Continue home Warfarin      Community acquired pneumonia in the setting of RAD- R sided PNA, however no history of or concern for aspiration. Rapid covid neg. S/p IV CTX and azithromycin and discharged with below regimen.  Prior to discharge patient was evaluated for need for home O2 and it was determined that she will need 2L NC O2 at home, which was set up for her prior to discharge. - Augmentin and doxycycline (3/30-4/3)  - Supplemental O2 at home (2L)  - Albuterol PRN  - Prednisone 40mg x 5 days (3/29-4/2)     HTN- stable on home regiment outpatient. - Continue home losartan 100mg daily and HCTZ 25mg      HLD-  (12/20/21)  - Continue home Pravastatin 20mg QHS     Depression-   - Continue home Celexa 20mg daily      Physical exam at discharge:   General: No acute distress. Alert. Cooperative. Head: Normocephalic. Atraumatic. Eyes:              Conjunctiva pink. Sclera white. Ears:              Hearing grossly intact. Nose:             No drainage. Throat: Mucosa pink. Moist mucous membranes. Neck: Supple. Normal ROM. No stiffness. Respiratory: Decreased air movement in lung bases, no wheezing or crackles    Cardiovascular: Tachycardia, irregularly irregular. No m/r/g. Pulses 2+ throughout. GI: + bowel sounds. Nontender. No rebound tenderness or guarding. Nondistended. Extremities: No LE edema    Skin: Warm, dry. No rashes. Neuro: CN II-XII grossly intact.               Condition at discharge: Stable    Labs  Recent Labs     03/29/22 0022 03/28/22 0528   WBC 11.3* 14.6*   HGB 10.9* 12.3   HCT 33.4* 37.6    211     Recent Labs     03/29/22 0022 03/28/22 0528    139   K 4.0 3.3*    99   CO2 31 27   BUN 18 15   CREA 0.82 1.05*   * 174*   CA 8.4* 8.2*   MG 2.7* 1.5*     Recent Labs     03/28/22 0528   ALT 42   AP 74   TBILI 1.2*   TP 7.6   ALB 3.3*   GLOB 4.3*     Recent Labs     03/29/22 0022 03/28/22 0528   INR 2.4* 3.0*   PTP 24.0* 29.2*       Microbiology  Results     Procedure Component Value Units Date/Time    CULTURE, RESPIRATORY/SPUTUM/BRONCH Balckman Selvin STAIN [[id number]] Collected: 03/29/22 1020    Order Status: Completed Specimen: Sputum Updated: 03/30/22 1218     Special Requests: NO SPECIAL REQUESTS GRAM STAIN OCCASIONAL WBCS SEEN         NO EPITHELIAL CELLS SEEN               OCCASIONAL GRAM POSITIVE COCCI IN PAIRS                  OCCASIONAL GRAM NEGATIVE RODS           Culture result:       MODERATE NORMAL RESPIRATORY CLYDE          COVID-19 RAPID TEST [[id number]] Collected: 03/28/22 1237    Order Status: Completed Specimen: Nasopharyngeal Updated: 03/28/22 1333     Specimen source Nasopharyngeal        COVID-19 rapid test Not detected        Comment: Rapid Abbott ID Now       Rapid NAAT:  The specimen is NEGATIVE for SARS-CoV-2, the novel coronavirus associated with COVID-19. Negative results should be treated as presumptive and, if inconsistent with clinical signs and symptoms or necessary for patient management, should be tested with an alternative molecular assay. Negative results do not preclude SARS-CoV-2 infection and should not be used as the sole basis for patient management decisions. This test has been authorized by the FDA under an Emergency Use Authorization (EUA) for use by authorized laboratories. Fact sheet for Healthcare Providers: [employer]nz  Fact sheet for Patients: [employer].co.nz       Methodology: Isothermal Nucleic Acid Amplification         CULTURE, BLOOD, PAIRED [[id number]] Collected: 03/28/22 0625    Order Status: Completed Specimen: Blood Updated: 03/30/22 0553     Special Requests: NO SPECIAL REQUESTS        Culture result: NO GROWTH 2 DAYS              Procedures / Diagnostic Studies  Echo Results  (Last 48 hours)    None           Imaging  XR CHEST PORT    Result Date: 3/28/2022  Patchy right-sided airspace disease. Follow-up to complete resolution in 6-8 weeks.        Chronic diagnoses   Problem List as of 3/29/2022 Date Reviewed: 3/28/2022          Codes Class Noted - Resolved    CAP (community acquired pneumonia) ICD-10-CM: J18.9  ICD-9-CM: 486  3/28/2022 - Present        Atrial fibrillation with RVR [facility]) ICD-10-CM: I48.91  ICD-9-CM: 427.31  3/28/2022 - Present        Acute respiratory failure with hypoxia (HCC) ICD-10-CM: J96.01  ICD-9-CM: 518.81  3/28/2022 - Present        Atrial fibrillation ([facility] ICD-10-CM: I48.91  ICD-9-CM: 427.31  Unknown - Present    Overview Signed 9/28/2021  3:23 PM by Halley Salgado MD     Afib discovered 8/21 - asymptomatic              Hypertension ICD-10-CM: I10  ICD-9-CM: 401.9  Unknown - Present        Depression with anxiety ICD-10-CM: F41.8  ICD-9-CM: 300.4  Unknown - Present        Family history of BRCA2 gene positive ICD-10-CM: Z84.81  ICD-9-CM: V18.9  2/2/2021 - Present    Overview Signed 2/2/2021  5:02 PM by Kevin Choudhary MD     Sister and niece, BRCA2             Cat's esophagus ICD-10-CM: K22.70  ICD-9-CM: 530.85  9/9/2019 - Present        Diverticular disease of colon ICD-10-CM: K57.30  ICD-9-CM: 562.10  9/9/2019 - Present        Hyperlipidemia ICD-10-CM: E78.5  ICD-9-CM: 272.4  9/9/2019 - Present        Severe obesity ([facility] ICD-10-CM: E66.01  ICD-9-CM: 278.01  4/4/2019 - Present        Benign essential hypertension ICD-10-CM: I10  ICD-9-CM: 401.1  4/4/2019 - Present              Discharge/Transfer Medications  Discharge Medication List as of 3/29/2022  3:11 PM      START taking these medications    Details   amoxicillin-clavulanate (AUGMENTIN) 875-125 mg per tablet Take 1 Tablet by mouth every twelve (12) hours for 10 doses. , Normal, Disp-10 Tablet, R-0      doxycycline (VIBRA-TABS) 100 mg tablet Take 1 Tablet by mouth every twelve (12) hours for 10 doses. , Normal, Disp-10 Tablet, R-0      predniSONE (DELTASONE) 20 mg tablet Take 40 mg by mouth daily for 4 days. , Normal, Disp-8 Tablet, R-0         CONTINUE these medications which have NOT CHANGED    Details   warfarin (COUMADIN) 5 mg tablet Take 5 mg by mouth five (5) days a week. Mon-Fri. None on Sa, Rousseau, Historical Med      metoprolol succinate (TOPROL-XL) 50 mg XL tablet Take 100 mg by mouth nightly. , Historical Med      losartan (COZAAR) 100 mg tablet Take 1 tablet by mouth once daily, Normal, Disp-90 Tablet, R-0      albuterol (ProAir HFA) 90 mcg/actuation inhaler Take 2 Puffs by inhalation every six (6) hours as needed for Wheezing., Normal, Disp-1 Each, R-2      hydroCHLOROthiazide (HYDRODIURIL) 25 mg tablet Take 1 Tablet by mouth daily. , Normal, Disp-90 Tablet, R-1      pravastatin (PRAVACHOL) 20 mg tablet Take 1 tablet by mouth nightly, Normal, Disp-90 Tab, R-3      citalopram (CELEXA) 20 mg tablet Take 1 Tab by mouth daily. , Normal, Disp-90 Tab, R-3      cholecalciferol, vitamin D3, (VITAMIN D3 PO) Take 1 Tablet by mouth daily. , Historical Med              Diet:  Regular diet.     Activity:  As tolerated    Disposition: Home    Discharge instructions to patient/family  Please seek medical attention for any new or worsening symptoms particularly fever, chest pain, shortness of breath, abdominal pain, nausea, vomiting    Follow up plans/appointments  Follow-up Information     Follow up With Specialties Details Why Contact Info    James Vega MD Family Medicine Go on 4/1/2022 Hospital follow up appointment - 10:00 AM [facility]      Boni Lewis MD Cardiology Go on 3/30/2022 3:40 AM - Cardiology follow up appointment for atrial fibrillation [address] [phone]      300 [facility].   home oxygen and supplies [address] [address]  [phone]    [address] [place] [address]  PT and nursing [address] [place] [address]  Mili Whitley MD  Family Medicine Resident       For Billing    Chief Complaint   Patient presents with    Vomiting   [facility] Problems  Date Reviewed: 3/28/2022          Codes Class Noted POA    CAP (community acquired pneumonia) ICD-10-CM: J18.9  ICD-9-CM: 791  3/28/2022 Yes        Atrial fibrillation with RVR ([facility] ICD-10-CM: I48.91  ICD-9-CM: 427.31  3/28/2022 Yes        Acute respiratory failure with hypoxia [facility]) ICD-10-CM: J96.01  ICD-9-CM: 518.81  3/28/2022 Yes        Benign essential hypertension ICD-10-CM: I10  ICD-9-CM: 401.1  4/4/2019 Yes

## 2022-03-29 NOTE — DISCHARGE INSTRUCTIONS
HOME DISCHARGE INSTRUCTIONS    Abbie Gil / [id number] : 1945    Admission date: 3/28/2022 Discharge date: 3/29/2022      Please bring this form with you to show your care provider at your follow-up appointment. Primary care provider:  James Vega MD    Discharging provider:  Alex Esparza MD  - Family Medicine Resident  Dewitte Goodell, [facility] Attending      You have been admitted to the hospital with the following diagnoses:    ACUTE DIAGNOSES:  · CAP (community acquired pneumonia) [J18.9]  · Atrial fibrillation with RVR ([address] [I48.91]  · Acute respiratory failure with hypoxia ([address] [J96.01]         FOLLOW-UP CARE RECOMMENDATIONS:    Appointments  Follow-up Information     Follow up With Specialties Details Why Contact Info    James Vega MD Family Medicine Go on 2022 Hospital follow up appointment - 10:00 AM [address] [facility]      oBni Lewis MD Cardiology Go on 3/30/2022 3:40 AM - Cardiology follow up appointment for atrial fibrillation [facility].   home oxygen and supplies [address] [place] [address]  [phone]         Follow-up tests needed: repeat CBC (complete blood count) to check white blood cell count and hemoglobin levels     Pending test results: At the time of your discharge the following test results are still pending: final blood culture results. Please make sure you review these results with your outpatient follow-up provider(s). DIET/what to eat:  Regular Diet    ACTIVITY:  Activity as tolerated    Wound care: None    Equipment needed:  Home O2 equipment; 2L    Specific symptoms to watch for: chest pain, shortness of breath, fever, chills, nausea, vomiting, diarrhea, change in mentation, falling, weakness, bleeding. What to do if new or unexpected symptoms occur?     If you experience any of the above symptoms (or should other concerns or questions arise after discharge) please call your primary care physician. Return to the emergency room if you cannot get hold of your doctor. · It is very important that you keep your follow-up appointment(s). · Please bring discharge papers, medication list (and/or medication bottles) to your follow-up appointments for review by your outpatient provider(s). · Please check the list of medications and be sure it includes every medication (even non-prescription medications) that your provider wants you to take. · It is important that you take the medication exactly as they are prescribed. · Keep your medication in the bottles provided by the pharmacist and keep a list of the medication names, dosages, and times to be taken in your wallet. · Do not take other medications without consulting your doctor. · If you have any questions about your medications or other instructions, please talk to your nurse or care provider before you leave the hospital.     Information obtained by:     I understand that if any problems occur once I am at home I am to contact my physician. These instructions were explained to me and I had the opportunity to ask questions. I understand and acknowledge receipt of the instructions indicated above.                                                                                                                                                Physician's or R.N.'s Signature                                                                  Date/Time                                                                                                                                              Patient or Representative Signature                                                          Date/Time

## 2022-03-29 NOTE — PROGRESS NOTES
[address] [address]   Office [phone]  Fax [phone]          Assessment and Plan     Sierra Zavala is a 77 y.o. female with a PMHx of Afib on Warfarin, HTN, HLD, depression and obesity who is admitted for Afib RVR in the setting of CAP. Patient was admitted on 3/28/2022. Afib with RVR- Follows with Dr. Pavithra Kc, onset ~8/2021. Previously well rate controlled outpatient. Minimal Trop elevated (65>64) likely due to RVR stress, no ischemic signs. Elevated BNP on admission (no Bl) but normal Echo 9/2021  - Continue Toprol XL 100mg qhs  - Replete K, check Mag  - Pharm to dose warfarin     Community acquired pneumonia in the setting of RAD- R sided PNA, however no history of or concern for aspiration. Rapid covid neg. S/p IV CTX and azithromycin.   - Augmentin and doxycycline (3/30-4/3)  - Supplemental O2 to maintain sats >92%  - Duonebs Q6H  - Zyrtec 10mg daily  - Prednisone 40mg x 5 days (3/29-4/2)  - Home O2 assessment today     HTN- Previously stable on home regiment outpatient. - Continue home losartan 100mg daily  - Hold home HCTZ 25mg for now as BPs borderline low, restart if needed     HLD-  (12/20/21)  - Continue home Pravastatin 20mg QHS     Depression-   - Continue home Celexa 20mg daily     Elevated fasting glucose- No history of DM or A1c in chart.  on arrival. Expect to increase with steroid use inpatient  - Monitor daily fasting BG      Lazaro Mares MD  Family Medicine Resident         Subjective / Objective     24 Hour Events: No acute events overnight    Subjective  Patient doing well this morning, reporting improvement of symptoms. Denies CP, SOB or any other complaints at this time. Visit Vitals  BP (!) 148/96 (BP 1 Location: Left upper arm, BP Patient Position: At rest)   Pulse (!) 110   Temp 97.7 °F (36.5 °C)   Resp 20   Ht 4' 11\" (1.499 m)   Wt 182 lb (82.6 kg)   SpO2 98%   BMI 36.76 kg/m²       General: No acute distress. Alert. Cooperative. Head: Normocephalic. Atraumatic. Eyes:              Conjunctiva pink. Sclera white. Ears:              Hearing grossly intact. Nose:             No drainage. Throat: Mucosa pink. Moist mucous membranes. Neck: Supple. Normal ROM. No stiffness. Respiratory: Decreased air movement in lung bases, no wheezing or crackles    Cardiovascular: Tachycardia, irregularly irregular. No m/r/g. Pulses 2+ throughout. GI: + bowel sounds. Nontender. No rebound tenderness or guarding. Nondistended. Extremities: No LE edema    Skin: Warm, dry. No rashes. Neuro: CN II-XII grossly intact. I/O:  Date 03/28/22 0700 - 03/29/22 0659 03/29/22 0700 - 03/30/22 0659   Shift 0975-4781 4072-5577 24 Hour Total 1990-4874 1195-6467 24 Hour Total   INTAKE   P.O.  100 100        P. O.  100 100      I. V.(mL/kg/hr) 600(0.6)  600(0.3)        Volume (sodium chloride 0.9 % bolus infusion 500 mL) 500  500        Volume (piperacillin-tazobactam (ZOSYN) 3.375 g in 0.9% sodium chloride (MBP/ADV) 100 mL MBP) 100  100      Shift Total(mL/kg) 600(7.3) 100(1.2) 700(8.5)      OUTPUT   Urine(mL/kg/hr)  400(0.4) 400(0.2)        Urine Voided  400 400      Shift Total(mL/kg)  400(4.8) 400(4.8)       -300 300      Weight (kg) 82.6 82.6 82.6 82.6 82.6 82.6       CBC:  Recent Labs     03/29/22 0022 03/28/22 0528   WBC 11.3* 14.6*   HGB 10.9* 12.3   HCT 33.4* 37.6    027       Metabolic Panel:  Recent Labs     03/29/22 0022 03/28/22 0528    139   K 4.0 3.3*    99   CO2 31 27   BUN 18 15   CREA 0.82 1.05*   * 174*   CA 8.4* 8.2*   MG  --  1.5*   ALB  --  3.3*   ALT  --  42   INR 2.4* 3.0*          For Billing    Chief Complaint   Patient presents with    Vomiting    Wheezing       Hospital Problems  Date Reviewed: 3/28/2022          Codes Class Noted POA    CAP (community acquired pneumonia) ICD-10-CM: J18.9  ICD-9-CM: 364  3/28/2022 Yes        Atrial fibrillation with RVR ([facility] ICD-10-CM: I48.91  ICD-9-CM: 427.31  3/28/2022 Yes        Acute respiratory failure with hypoxia [facility]) ICD-10-CM: J96.01  ICD-9-CM: 518.81  3/28/2022 Yes        Benign essential hypertension ICD-10-CM: I10  ICD-9-CM: 401.1  4/4/2019 Yes

## 2022-03-29 NOTE — TELEPHONE ENCOUNTER
Home health nurse Cristiana called to advise Dr. Brent Lewis pt is being discharged from [facility] today and wants to confirm she'll sign off on pt's home health orders. Advised Cristiana pt scheduled for 4/1/22 with Dr. Brent Lewis.  Aristides

## 2022-03-29 NOTE — PROGRESS NOTES
Bedside and Verbal shift change report given to Jacek Flowers (oncoming nurse) by Larry Campbell  (offgoing nurse). Report included the following information SBAR, Kardex, MAR, Accordion and Recent Results.

## 2022-03-29 NOTE — PROGRESS NOTES
Patient verbalized understanding of discharge instructions. Patient was discharged with her belongings.

## 2022-03-29 NOTE — PROGRESS NOTES
1350:  Pt accepted by [facility]. 1149:  Pt had no preference for a DME company. A referral was sent in [facility] to [facility] 55 had no preference for a hh agency. A referral was sent in  to [facility]. Transition of Care Plan: RUR-11%  1. PT/OT consults: rec is for home PT; nursing added  2. Pt needs home O2--no preference for a company  3. Pt's son to transport home at d/c  L. Rashaad Garcia RN

## 2022-03-29 NOTE — ROUTINE PROCESS
Bedside shift change report given to Tami Singh (oncoming nurse) by [facility] (offgoing nurse). Report included the following information SBAR, Kardex, Intake/Output, MAR, Accordion and Recent Results.

## 2022-03-29 NOTE — PROGRESS NOTES
Problem: Mobility Impaired (Adult and Pediatric)  Goal: *Acute Goals and Plan of Care (Insert Text)  Description: FUNCTIONAL STATUS PRIOR TO ADMISSION: Patient was independent and active without use of DME.    HOME SUPPORT PRIOR TO ADMISSION: The patient lived with roommate but did not require assist.    Physical Therapy Goals  Initiated 3/29/2022  1. Patient will move from supine to sit and sit to supine  in bed with modified independence within 7 day(s). 2.  Patient will transfer from bed to chair and chair to bed with modified independence using the least restrictive device within 7 day(s). 3.  Patient will perform sit to stand with modified independence within 7 day(s). 4.  Patient will ambulate with modified independence for 150 feet with the least restrictive device within 7 day(s). 5.  Patient will ascend/descend 4 stairs with one handrail(s) with modified independence within 7 day(s). Outcome: Not Met     PHYSICAL THERAPY EVALUATION  Patient: Maynor Zavala (77 y.o. female)  Date: 3/29/2022  Primary Diagnosis: CAP (community acquired pneumonia) [J18.9]  Atrial fibrillation with RVR ([address] [I48.91]  Acute respiratory failure with hypoxia ([address] [J96.01]        Precautions: Falls       ASSESSMENT  Based on the objective data described below, the patient presents with generally decreased strength, decreased endurance, new supplemental O2 need, tachycardia, and limited functional mobility on day 1 of admission with CAP, afib RVR, and respiratory failure. Pt participates in bed mobility, transfers, and flat surface ambulation over 3 separate trials without assistive device. She requires 2LNCO2 to maintain SpO2 >90% with activity and HR as high as 137 with activity. She reports mild dyspnea on exertion and quickly rebounds to resting HR and SpO2 once activity ceases. Pt reports premorbid L knee pain and balance impairments.      Current Level of Function Impacting Discharge (mobility/balance): CGA to Supervision    Functional Outcome Measure: The patient scored 17 on the Tinetti outcome measure which is indicative of high fall risk. Other factors to consider for discharge: +fall history; able to stay with son after discharge if necessary     Patient will benefit from skilled therapy intervention to address the above noted impairments. PLAN :  Recommendations and Planned Interventions: bed mobility training, transfer training, gait training, therapeutic exercises, neuromuscular re-education, modalities, edema management/control, patient and family training/education, and therapeutic activities      Frequency/Duration: Patient will be followed by physical therapy:  5 times a week to address goals. Recommendation for discharge: (in order for the patient to meet his/her long term goals)  Physical therapy at least 2 days/week in the home AND ensure assist and/or supervision for safety with all functional mobility; may progress to outpatient PT with continued interventions in hospital    This discharge recommendation:  Has not yet been discussed the attending provider and/or case management    IF patient discharges home will need the following DME: to be determined (TBD)         SUBJECTIVE:   Patient stated I don't want to be here, re: good motivation to mobilize and return home. Pt received supine, agreeable to PT and cleared by RN.       OBJECTIVE DATA SUMMARY:   HISTORY:    Past Medical History:   Diagnosis Date    Arthritis     Asthma     Atrial fibrillation ([address]     Afib discovered 8/21 - asymptomatic     Calculus of kidney     Congestive heart failure (HCC)     Depression with anxiety     Hypertension      Past Surgical History:   Procedure Laterality Date    HX HYSTERECTOMY  1990    For fibroids       Personal factors and/or comorbidities impacting plan of care: as above    Home Situation  Home Environment: Private residence  # Steps to Enter: 0  One/Two Story Residence: One story  Living Alone: No  Support Systems: Child(giselle),Friend/Neighbor  Patient Expects to be Discharged to[de-identified] Home with one level  Current DME Used/Available at Home: None  Tub or Shower Type: Tub/Shower combination    EXAMINATION/PRESENTATION/DECISION MAKING:   Critical Behavior:  Neurologic State: Alert  Orientation Level: Oriented X4  Cognition: Follows commands  Safety/Judgement: Awareness of environment  Hearing: Auditory  Auditory Impairment: None  Skin:  LE exposed skin intact; RUE PIV x 2  Edema: none noted LEs  Range Of Motion:  AROM: Generally decreased, functional                       Strength:    Strength: Generally decreased, functional                    Tone & Sensation:   Tone: Normal              Sensation: Intact               Coordination:  Coordination: Within functional limits       Functional Mobility:  Bed Mobility:  Rolling: Modified independent; Adaptive equipment  Supine to Sit: Modified independent; Adaptive equipment;Bed Modified     Scooting: Modified independent  Transfers:  Sit to Stand: Contact guard assistance;Stand-by assistance  Stand to Sit: Stand-by assistance                       Balance:   Sitting: Without support  Sitting - Static: Good (unsupported)  Sitting - Dynamic: Good (unsupported)  Standing: Without support  Standing - Static: Good  Standing - Dynamic :  (fair to good)  Ambulation/Gait Training:  Distance (ft):  (15 + 45 + 15)  Assistive Device: Gait belt  Ambulation - Level of Assistance: Stand-by assistance;Contact guard assistance        Gait Abnormalities: Decreased step clearance        Base of Support: Narrowed     Speed/Dara: Pace decreased (<100 feet/min)                 No loss of balance; she verbally acknowledges narrow base of support with increased lateral trunk movements.                Functional Measure:  Tinetti test:    Sitting Balance: 0  Arises: 1  Attempts to Rise: 1  Immediate Standing Balance: 1  Standing Balance: 1  Nudged: 1  Eyes Closed: 0  Turn 360 Degrees - Continuous/Discontinuous: 1  Turn 360 Degrees - Steady/Unsteady: 1  Sitting Down: 1  Balance Score: 8 Balance total score  Indication of Gait: 1  R Step Length/Height: 1  L Step Length/Height: 1  R Foot Clearance: 1  L Foot Clearance: 1  Step Symmetry: 1  Step Continuity: 1  Path: 1  Trunk: 1  Walking Time: 0  Gait Score: 9 Gait total score  Total Score: 17/28 Overall total score         Tinetti Tool Score Risk of Falls  <19 = High Fall Risk  19-24 = Moderate Fall Risk  25-28 = Low Fall Risk  Tinetti ME. Performance-Oriented Assessment of Mobility Problems in Elderly Patients. [address] 66; [id number]. (Scoring Description: PT Bulletin Feb. 10, 1993)    Older adults: Daniel Quiroz et al, 2009; n = 1000 [facility] elderly evaluated with ABC, JULITO, ADL, and IADL)  · Mean JULITO score for males aged 69-68 years = 26.21(3.40)  · Mean JULITO score for females age 69-68 years = 25.16(4.30)  · Mean JULITO score for males over 80 years = 23.29(6.02)  · Mean JULITO score for females over 80 years = 17.20(8.32)            Physical Therapy Evaluation Charge Determination   History Examination Presentation Decision-Making   HIGH Complexity :3+ comorbidities / personal factors will impact the outcome/ POC  HIGH Complexity : 4+ Standardized tests and measures addressing body structure, function, activity limitation and / or participation in recreation  MEDIUM Complexity : Evolving with changing characteristics  Other outcome measures tinetti  HIGH       Based on the above components, the patient evaluation is determined to be of the following complexity level: MEDIUM    Pain Rating:  Denies pain    Activity Tolerance:   desaturates with exertion and requires oxygen - see prior documentation by this author today.     After treatment patient left in no apparent distress:   Sitting in chair, Call bell within reach, and Bed / chair alarm activated    COMMUNICATION/EDUCATION:   The patients plan of care was discussed with: Occupational therapist and Registered nurse. Fall prevention education was provided and the patient/caregiver indicated understanding., Patient/family have participated as able in goal setting and plan of care. , and Patient/family agree to work toward stated goals and plan of care. Encouraged pt to mobilize out of bed to chair with staff assistance at least 3x/day.       Thank you for this referral.  Rohan Zaman, PT, DPT   Time Calculation: 32 mins

## 2022-03-29 NOTE — PROGRESS NOTES
In preparation for discharge later today, I have completed AVS Med-updates and added educational information to the AVS. Primary nurse updated.

## 2022-03-29 NOTE — PROGRESS NOTES
OCCUPATIONAL THERAPY EVALUATION/DISCHARGE  Patient: Robyn Zavala (77 y.o. female)  Date: 3/29/2022  Primary Diagnosis: CAP (community acquired pneumonia) [J18.9]  Atrial fibrillation with RVR ([facility] [I48.91]  Acute respiratory failure with hypoxia ([facility] [J96.01]       Precautions: fall       ASSESSMENT  Based on the objective data described below, the patient presents with good overall activity tolerance following admission for CAP and Afib with RVR. Patient reports she lives with a roommate and has support at home if needed but has been independent PTA. Today, patient was performed transfers and ADLs without LOB or physical assistance needed. Patient was educated on energy conservation techniques and general home safety. Patient has no further skilled OT needs and anticipates discharge home today. Current Level of Function (ADLs/self-care): Patient is independent to mod I level for ADLs and functional mobility. Functional Outcome Measure: The patient scored  85/100 on the Barthel Index outcome measure. PLAN :    Recommendation for discharge: (in order for the patient to meet his/her long term goals)  No skilled occupational therapy/ follow up rehabilitation needs identified at this time. This discharge recommendation:  Has been made in collaboration with the attending provider and/or case management    IF patient discharges home will need the following DME: none       SUBJECTIVE:   Patient agreeable to OT evaluation. OBJECTIVE DATA SUMMARY:   HISTORY:   Past Medical History:   Diagnosis Date    Arthritis     Asthma     Atrial fibrillation ([facility]     Afib discovered 8/21 - asymptomatic     Calculus of kidney     Congestive heart failure (HCC)     Depression with anxiety     Hypertension      Past Surgical History:   Procedure Laterality Date    HX HYSTERECTOMY  1990    For fibroids       Prior Level of Function/Environment/Context: Patient lives with a roommate.   Expanded or extensive additional review of patient history:   Home Situation  Home Environment: Private residence  # Steps to Enter: 0  One/Two Story Residence: One story  Living Alone: No  Support Systems: Child(giselle),Friend/Neighbor  Patient Expects to be Discharged to[de-identified] Home with home health  Current DME Used/Available at Home: None  Tub or Shower Type: Tub/Shower combination    Hand dominance: Right    EXAMINATION OF PERFORMANCE DEFICITS:  Cognitive/Behavioral Status:  Neurologic State: Alert  Orientation Level: Oriented X4  Cognition: Appropriate decision making; Appropriate for age attention/concentration; Appropriate safety awareness  Perception: Appears intact  Perseveration: No perseveration noted  Safety/Judgement: Awareness of environment    Skin: Intact in the uppers    Edema: None noted in the uppers    Hearing: Auditory  Auditory Impairment: None    Vision/Perceptual:    Tracking: Able to track stimulus in all quadrants w/o difficulty    Diplopia: No    Acuity: Within Defined Limits       Range of Motion:   WDL in the uppers    Strength:  WDL in the uppers    Coordination:  Fine Motor Skills-Upper: Left Intact; Right Intact    Gross Motor Skills-Upper: Left Intact; Right Intact    Tone & Sensation:  Tone: Normal  Sensation: Intact    Balance:  Sitting: Intact  Sitting - Static: Good (unsupported)  Sitting - Dynamic: Good (unsupported)  Standing: Intact  Standing - Static: Good  Standing - Dynamic :  (fair to good)    Functional Mobility and Transfers for ADLs:  Bed Mobility:  Rolling: Modified independent; Adaptive equipment  Supine to Sit: Modified independent; Adaptive equipment;Bed Modified  Scooting: Modified independent    Transfers:  Sit to Stand: Stand-by assistance  Stand to Sit: Stand-by assistance  Bed to Chair: Stand-by assistance  Bathroom Mobility: Stand-by assistance  Toilet Transfer : Stand-by assistance    ADL Assessment:  Feeding: Independent    Oral Facial Hygiene/Grooming: Independent    Bathing: Modified independent    Upper Body Dressing: Modified independent    Lower Body Dressing: Modified independent    Toileting: Modified independent    Cognitive Retraining  Safety/Judgement: Awareness of environment    Functional Measure:    Barthel Index:  Bathin  Bladder: 10  Bowels: 10  Groomin  Dressing: 10  Feeding: 10  Mobility: 10  Stairs: 5  Toilet Use: 10  Transfer (Bed to Chair and Back): 10  Total: 85/100      The Barthel ADL Index: Guidelines  1. The index should be used as a record of what a patient does, not as a record of what a patient could do. 2. The main aim is to establish degree of independence from any help, physical or verbal, however minor and for whatever reason. 3. The need for supervision renders the patient not independent. 4. A patient's performance should be established using the best available evidence. Asking the patient, friends/relatives and nurses are the usual sources, but direct observation and common sense are also important. However direct testing is not needed. 5. Usually the patient's performance over the preceding 24-48 hours is important, but occasionally longer periods will be relevant. 6. Middle categories imply that the patient supplies over 50 per cent of the effort. 7. Use of aids to be independent is allowed. Score Interpretation (from [address] [facility] [address])    Independent   60-79 Minimally independent   40-59 Partially dependent   20-39 Very dependent   <20 Totally dependent     -Olivia Rodgers., Barthel, D.W. (1965). Functional evaluation: the Barthel Index. [address] [facility] ([address] (1997). The Barthel activities of daily living index: self-reporting versus actual performance in the old (> or = 75 years). Journal of [address] [place] [address] 45(7), [address] [facility], JAndieJAndie., [facility]., Kirit Jensen. (1999).  Measuring the change in disability after inpatient rehabilitation; comparison of the responsiveness of the Barthel Index and Functional [place] Measure. Journal of Neurology, Neurosurgery, and Psychiatry, 66(4), 298-891. YANET Diaz.A, VALENTE Lee, & Michael John M.A. (2004) Assessment of post-stroke quality of life in cost-effectiveness studies: The usefulness of the Barthel Index and the EuroQoL-5D. Quality of Life Research, 15, 495-00       Occupational Therapy Evaluation Charge Determination   History Examination Decision-Making   LOW Complexity : Brief history review  LOW Complexity : 1-3 performance deficits relating to physical, cognitive , or psychosocial skils that result in activity limitations and / or participation restrictions  LOW Complexity : No comorbidities that affect functional and no verbal or physical assistance needed to complete eval tasks       Based on the above components, the patient evaluation is determined to be of the following complexity level: LOW     Activity Tolerance:   Good    After treatment patient left in no apparent distress:    Sitting in chair, Call bell within reach and Bed / chair alarm activated    COMMUNICATION/EDUCATION:   The patients plan of care was discussed with: Physical therapist, Registered nurse and patient. .     Thank you for this referral.  JOSE Walker/L  Time Calculation: 39 mins

## 2022-03-29 NOTE — PROGRESS NOTES
Documentation for home O2:         ROOM AIR    AT REST   O2 SATS  87% HR  98     ROOM AIR WITH ACTIVITY 02 SATS  86% HR  137   (2    ) LITERS OF O2 WITH ACTIVITY O2 SATS  91% HR  133   (2   )LITERS OF 02 PATIENT LEFT COMFORTABLY  SITTING/SUPINE 02 SATS  98% HR  108       Full PT note to follow.

## 2022-03-29 NOTE — TELEPHONE ENCOUNTER
Pt called stating she is in hospital, plans to be discharged by \"hopefully\" today or tomorrow. She is wondering if she should keep her appointment for 3/30 with Dr. Amarjit Montelongo or not. She is worried about driving due to SOB, but wants to make the appt if it is important that Dr. Amarjit Montelongo sees her. Please advise.      Phone: [phone]

## 2022-03-29 NOTE — PROGRESS NOTES
[facility] Dosing Services: 03/29/22  Consult for Warfarin Dosing by Pharmacy by Dr. Sloane Landa provided for this 76 yo female for indication of Chronic Afib  Day of therapy: resumed from home. (PTA: Warfarin 5mg Mon/Tue/Wed/Thur/Fri & none on Sat/Sun)  Dose to achieve an INR goal of 2-3    Order entered for  Warfarin 4(mg) ordered to be given today at 18:00. Significant drug interactions: Azithromycin  Previous dose given 2.5 mg   PT/INR Lab Results   Component Value Date/Time    INR 2.4 (H) 03/29/2022 12:22 AM      Platelets Lab Results   Component Value Date/Time    PLATELET 331 65/63/6305 12:22 AM      H/H Lab Results   Component Value Date/Time    HGB 10.9 (L) 03/29/2022 12:22 AM        Pharmacy to follow daily and will provide subsequent Warfarin dosing based on clinical status.   Sakina Townsend)  Contact information [phone]

## 2022-03-29 NOTE — PROGRESS NOTES
Problem: Falls - Risk of  Goal: *Absence of Falls  Description: Document Hector Kiran Fall Risk and appropriate interventions in the flowsheet.   Outcome: Progressing Towards Goal  Note: Fall Risk Interventions:  Mobility Interventions: Bed/chair exit alarm,Patient to call before getting OOB         Medication Interventions: Bed/chair exit alarm,Patient to call before getting OOB,Teach patient to arise slowly    Elimination Interventions: Bed/chair exit alarm,Call light in reach,Patient to call for help with toileting needs

## 2022-03-30 ENCOUNTER — PATIENT OUTREACH (OUTPATIENT)
Dept: CASE MANAGEMENT | Age: 77
End: 2022-03-30

## 2022-03-30 NOTE — ACP (ADVANCE CARE PLANNING)
Advance Care Planning:   Does patient have an Advance Directive:  reports she has a DNR.  Requested copy to be scanned into chart

## 2022-03-30 NOTE — PROGRESS NOTES
Care Transitions Initial Call    Call within 2 business days of discharge: Yes     Patient: Heydi Zavala Patient : 1945 MRN: [id number]    Last Discharge [address]       Complaint Diagnosis Description Type Department Provider    3/28/22 Vomiting; Wheezing Community acquired pneumonia of right lower lobe of lung . .. ED to Hosp-Admission (Discharged) (ADMIT) Dorothea Webb, DO; Xochitl Diaz... 3/30/2022  Initial outreach attempt unsuccessful, LMTCB    Incoming call received from patient    Was this an external facility discharge? No     Challenges to be reviewed by the provider   Additional needs identified to be addressed with provider:      Method of communication with provider : none    Discussed COVID-19 related testing which was available at this time. Test results were negative. Patient informed of results, if available? yes     Advance Care Planning:   Does patient have an Advance Directive:  reports she has a DNR. Requested copy to be scanned into chart    Inpatient Readmission Risk score: Unplanned Readmit Risk Score: 11 ( )    Was this a readmission? no   Patient stated reason for the admission: vomiting    Patients top risk factors for readmission: medical condition-PNA   Interventions to address risk factors: Scheduled appointment with PCP- and Obtained and reviewed discharge summary and/or continuity of care documents    Care Transition Nurse (CTN) contacted the patient by telephone to perform post hospital discharge assessment. Verified name and  with patient as identifiers. Provided introduction to self, and explanation of the CTN role. Reports she is doing well. Endorses productive cough and congestion, but denies chest pain, sob, fever. Compliant with 2 L of oxygen with oxygen saturation of 96-99%. No bladder/bowel concerns. CTN reviewed discharge instructions, medical action plan and red flags with patient who verbalized understanding.  Were discharge instructions available to patient? yes. Reviewed appropriate site of care based on symptoms and resources available to patient including: PCP and Home Health. Patient given an opportunity to ask questions and does not have any further questions or concerns at this time. The patient agrees to contact the PCP office for questions related to their healthcare. Medication reconciliation was performed with patient, who verbalizes understanding of administration of home medications. Referral to Pharm D needed: no     Home Health/Outpatient orders at discharge: home health care, PT and [address]: [facility]  Date of initial visit: 3/30/2022    Durable Medical Equipment ordered at discharge: [address] Equipment received: yes    Covid Risk Education    Educated patient about risk for severe COVID-19 due to risk factors according to CDC guidelines. CTN reviewed discharge instructions, medical action plan and red flag symptoms with the patient who verbalized understanding. Discussed COVID vaccination status: no. Education provided on COVID-19 vaccination as appropriate. Discussed exposure protocols and quarantine with CDC Guidelines. Patient was given an opportunity to verbalize any questions and concerns and agrees to contact CTN or health care provider for questions related to their healthcare. Was patient discharged with a pulse oximeter? No; however, patient has one. Discussed follow-up appointments. If no appointment was previously scheduled, appointment scheduling offered: no. Is follow up appointment scheduled within 7 days of discharge? yes. [address] follow up appointment(s):   Future Appointments   Date Time Provider Kary Rowland   4/1/2022 10:00 AM Peter Perez MD [facility] BS AMB   4/5/2022 11:30 AM [facility]   5/24/2022 11:00 AM Nic Branch MD [facility] BS AMB     Non-[facility] follow up appointment(s): NA    Plan for follow-up call in 10-14 days based on severity of symptoms and risk factors. Plan for next call: symptom management-cough improved and medication management-completed abx  CTN provided contact information for future needs. Goals Addressed                 This Visit's Progress     Instruct on red flags (ie. fever, increased productive cough, SOB, and chest pain) and when to seek urgent medical treatment.  Prevent complications post hospitalization.           03/30/22   Will not fall during TERRI episode   Will complete abx therapy   Will attend follow up appt with PCP scheduled 4/1   Pt will remain out of the hospital or ER for remainder of TERRI period

## 2022-03-31 ENCOUNTER — HOME CARE VISIT (OUTPATIENT)
Dept: SCHEDULING | Facility: HOME HEALTH | Age: 77
End: 2022-03-31
Payer: MEDICARE

## 2022-03-31 ENCOUNTER — TELEPHONE (OUTPATIENT)
Dept: FAMILY MEDICINE CLINIC | Age: 77
End: 2022-03-31

## 2022-03-31 VITALS
TEMPERATURE: 97.1 F | RESPIRATION RATE: 19 BRPM | SYSTOLIC BLOOD PRESSURE: 130 MMHG | HEART RATE: 100 BPM | DIASTOLIC BLOOD PRESSURE: 60 MMHG | OXYGEN SATURATION: 100 %

## 2022-03-31 LAB
BACTERIA SPEC CULT: NORMAL
GRAM STN SPEC: NORMAL
SERVICE CMNT-IMP: NORMAL

## 2022-03-31 PROCEDURE — 3331090002 HH PPS REVENUE DEBIT

## 2022-03-31 PROCEDURE — G0299 HHS/HOSPICE OF RN EA 15 MIN: HCPCS

## 2022-03-31 PROCEDURE — 400018 HH-NO PAY CLAIM PROCEDURE

## 2022-03-31 PROCEDURE — 3331090001 HH PPS REVENUE CREDIT

## 2022-03-31 PROCEDURE — 400013 HH SOC

## 2022-03-31 NOTE — TELEPHONE ENCOUNTER
Really can't give a good impression without seeing the patient (not seen since aug ) but I agree its possible that antibiotics could be causing this  If intolerable would need to revisit medication mgmt which woul dneed an immediate visit

## 2022-03-31 NOTE — TELEPHONE ENCOUNTER
Home health nurse Georgie called to advise Dr. Elpidio Ramos of pt's complaint of diarrhea for the last few days, possibly due to side affect from Doxycycline and Amoxicillin given in the hospital. Geeta Chavez

## 2022-04-01 PROCEDURE — 3331090002 HH PPS REVENUE DEBIT

## 2022-04-01 PROCEDURE — 3331090001 HH PPS REVENUE CREDIT

## 2022-04-02 PROCEDURE — 3331090001 HH PPS REVENUE CREDIT

## 2022-04-02 PROCEDURE — 3331090002 HH PPS REVENUE DEBIT

## 2022-04-03 LAB
BACTERIA SPEC CULT: NORMAL
SERVICE CMNT-IMP: NORMAL

## 2022-04-03 PROCEDURE — 3331090002 HH PPS REVENUE DEBIT

## 2022-04-03 PROCEDURE — 3331090001 HH PPS REVENUE CREDIT

## 2022-04-04 PROCEDURE — 3331090001 HH PPS REVENUE CREDIT

## 2022-04-04 PROCEDURE — 3331090002 HH PPS REVENUE DEBIT

## 2022-04-05 ENCOUNTER — HOME CARE VISIT (OUTPATIENT)
Dept: SCHEDULING | Facility: HOME HEALTH | Age: 77
End: 2022-04-05
Payer: MEDICARE

## 2022-04-05 LAB — INR, EXTERNAL: 1.3 (ref 2–3)

## 2022-04-05 PROCEDURE — 3331090001 HH PPS REVENUE CREDIT

## 2022-04-05 PROCEDURE — G0300 HHS/HOSPICE OF LPN EA 15 MIN: HCPCS

## 2022-04-05 PROCEDURE — 3331090002 HH PPS REVENUE DEBIT

## 2022-04-06 ENCOUNTER — HOME CARE VISIT (OUTPATIENT)
Dept: SCHEDULING | Facility: HOME HEALTH | Age: 77
End: 2022-04-06
Payer: MEDICARE

## 2022-04-06 ENCOUNTER — TELEPHONE ANTICOAG (OUTPATIENT)
Dept: CARDIOLOGY CLINIC | Age: 77
End: 2022-04-06

## 2022-04-06 VITALS
RESPIRATION RATE: 24 BRPM | DIASTOLIC BLOOD PRESSURE: 90 MMHG | OXYGEN SATURATION: 98 % | SYSTOLIC BLOOD PRESSURE: 118 MMHG | HEART RATE: 88 BPM | TEMPERATURE: 97.3 F

## 2022-04-06 DIAGNOSIS — I48.0 PAROXYSMAL ATRIAL FIBRILLATION (HCC): Primary | ICD-10-CM

## 2022-04-06 PROCEDURE — G0151 HHCP-SERV OF PT,EA 15 MIN: HCPCS

## 2022-04-06 PROCEDURE — 3331090002 HH PPS REVENUE DEBIT

## 2022-04-06 PROCEDURE — 3331090001 HH PPS REVENUE CREDIT

## 2022-04-07 ENCOUNTER — HOME CARE VISIT (OUTPATIENT)
Dept: SCHEDULING | Facility: HOME HEALTH | Age: 77
End: 2022-04-07
Payer: MEDICARE

## 2022-04-07 ENCOUNTER — TRANSCRIBE ORDER (OUTPATIENT)
Dept: SCHEDULING | Age: 77
End: 2022-04-07

## 2022-04-07 VITALS
DIASTOLIC BLOOD PRESSURE: 62 MMHG | HEART RATE: 95 BPM | WEIGHT: 177 LBS | RESPIRATION RATE: 20 BRPM | BODY MASS INDEX: 35.75 KG/M2 | SYSTOLIC BLOOD PRESSURE: 101 MMHG | OXYGEN SATURATION: 99 % | TEMPERATURE: 98.6 F

## 2022-04-07 VITALS
BODY MASS INDEX: 35.75 KG/M2 | DIASTOLIC BLOOD PRESSURE: 60 MMHG | HEART RATE: 87 BPM | WEIGHT: 177 LBS | OXYGEN SATURATION: 99 % | SYSTOLIC BLOOD PRESSURE: 97 MMHG | RESPIRATION RATE: 20 BRPM | TEMPERATURE: 98.1 F

## 2022-04-07 DIAGNOSIS — Z12.31 VISIT FOR SCREENING MAMMOGRAM: Primary | ICD-10-CM

## 2022-04-07 PROCEDURE — 3331090001 HH PPS REVENUE CREDIT

## 2022-04-07 PROCEDURE — 3331090002 HH PPS REVENUE DEBIT

## 2022-04-07 PROCEDURE — G0300 HHS/HOSPICE OF LPN EA 15 MIN: HCPCS

## 2022-04-08 ENCOUNTER — HOME CARE VISIT (OUTPATIENT)
Dept: SCHEDULING | Facility: HOME HEALTH | Age: 77
End: 2022-04-08
Payer: MEDICARE

## 2022-04-08 VITALS
TEMPERATURE: 97.8 F | RESPIRATION RATE: 18 BRPM | HEART RATE: 109 BPM | DIASTOLIC BLOOD PRESSURE: 80 MMHG | SYSTOLIC BLOOD PRESSURE: 118 MMHG | OXYGEN SATURATION: 95 %

## 2022-04-08 PROCEDURE — G0151 HHCP-SERV OF PT,EA 15 MIN: HCPCS

## 2022-04-08 PROCEDURE — 3331090001 HH PPS REVENUE CREDIT

## 2022-04-08 PROCEDURE — 3331090002 HH PPS REVENUE DEBIT

## 2022-04-09 PROCEDURE — 3331090001 HH PPS REVENUE CREDIT

## 2022-04-09 PROCEDURE — 3331090002 HH PPS REVENUE DEBIT

## 2022-04-10 PROCEDURE — 3331090001 HH PPS REVENUE CREDIT

## 2022-04-10 PROCEDURE — 3331090002 HH PPS REVENUE DEBIT

## 2022-04-11 ENCOUNTER — TELEPHONE ANTICOAG (OUTPATIENT)
Dept: CARDIOLOGY CLINIC | Age: 77
End: 2022-04-11

## 2022-04-11 ENCOUNTER — VIRTUAL VISIT (OUTPATIENT)
Dept: FAMILY MEDICINE CLINIC | Age: 77
End: 2022-04-11
Payer: MEDICARE

## 2022-04-11 DIAGNOSIS — Z09 HOSPITAL DISCHARGE FOLLOW-UP: ICD-10-CM

## 2022-04-11 DIAGNOSIS — I48.91 ATRIAL FIBRILLATION WITH RVR (HCC): ICD-10-CM

## 2022-04-11 DIAGNOSIS — J18.9 PNEUMONIA OF RIGHT LOWER LOBE DUE TO INFECTIOUS ORGANISM: Primary | ICD-10-CM

## 2022-04-11 DIAGNOSIS — J18.9 COMMUNITY ACQUIRED PNEUMONIA OF RIGHT LOWER LOBE OF LUNG: ICD-10-CM

## 2022-04-11 DIAGNOSIS — F41.8 DEPRESSION WITH ANXIETY: ICD-10-CM

## 2022-04-11 DIAGNOSIS — R26.89 IMBALANCE: ICD-10-CM

## 2022-04-11 DIAGNOSIS — E78.5 HYPERLIPIDEMIA, UNSPECIFIED HYPERLIPIDEMIA TYPE: ICD-10-CM

## 2022-04-11 DIAGNOSIS — I48.0 PAROXYSMAL ATRIAL FIBRILLATION (HCC): Primary | ICD-10-CM

## 2022-04-11 DIAGNOSIS — I10 BENIGN ESSENTIAL HYPERTENSION: ICD-10-CM

## 2022-04-11 DIAGNOSIS — J45.40 MODERATE PERSISTENT ASTHMA WITHOUT COMPLICATION: ICD-10-CM

## 2022-04-11 DIAGNOSIS — I48.91 ATRIAL FIBRILLATION, UNSPECIFIED TYPE (HCC): ICD-10-CM

## 2022-04-11 DIAGNOSIS — R29.898 MUSCULAR DECONDITIONING: ICD-10-CM

## 2022-04-11 PROBLEM — J96.01 ACUTE RESPIRATORY FAILURE WITH HYPOXIA (HCC): Status: RESOLVED | Noted: 2022-03-28 | Resolved: 2022-04-11

## 2022-04-11 LAB — INR, EXTERNAL: 2.2 (ref 2–3)

## 2022-04-11 PROCEDURE — 3331090001 HH PPS REVENUE CREDIT

## 2022-04-11 PROCEDURE — 3331090002 HH PPS REVENUE DEBIT

## 2022-04-11 PROCEDURE — 1111F DSCHRG MED/CURRENT MED MERGE: CPT | Performed by: FAMILY MEDICINE

## 2022-04-11 PROCEDURE — G8427 DOCREV CUR MEDS BY ELIG CLIN: HCPCS | Performed by: FAMILY MEDICINE

## 2022-04-11 PROCEDURE — 99495 TRANSJ CARE MGMT MOD F2F 14D: CPT | Performed by: FAMILY MEDICINE

## 2022-04-11 RX ORDER — ALBUTEROL SULFATE 90 UG/1
2 AEROSOL, METERED RESPIRATORY (INHALATION)
Qty: 1 EACH | Refills: 2 | Status: SHIPPED | OUTPATIENT
Start: 2022-04-11 | End: 2022-10-31

## 2022-04-11 RX ORDER — CITALOPRAM 20 MG/1
20 TABLET, FILM COATED ORAL DAILY
Qty: 90 TABLET | Refills: 3 | Status: SHIPPED | OUTPATIENT
Start: 2022-04-11

## 2022-04-11 RX ORDER — METOPROLOL SUCCINATE 100 MG/1
100 TABLET, EXTENDED RELEASE ORAL
Qty: 90 TABLET | Refills: 3 | Status: SHIPPED | OUTPATIENT
Start: 2022-04-11 | End: 2022-05-07 | Stop reason: SDUPTHER

## 2022-04-11 RX ORDER — LOSARTAN POTASSIUM 100 MG/1
100 TABLET ORAL DAILY
Qty: 90 TABLET | Refills: 3 | Status: SHIPPED | OUTPATIENT
Start: 2022-04-11

## 2022-04-11 RX ORDER — PRAVASTATIN SODIUM 20 MG/1
TABLET ORAL
Qty: 90 TABLET | Refills: 3 | Status: SHIPPED | OUTPATIENT
Start: 2022-04-11 | End: 2022-04-28

## 2022-04-11 NOTE — PROGRESS NOTES
No chief complaint on file. 1. Have you been to the ER, urgent care clinic since your last visit? Hospitalized since your last visit? Yes, [facility], pneumonia and afib. 2. Have you seen or consulted any other health care providers outside of the [address] [place] [address] since your last visit? Include any pap smears or colon screening.  No  \

## 2022-04-11 NOTE — PROGRESS NOTES
Virtual Video Transitional Care Management Progress Note    Patient: Roseanna Zavala  : 1945  PCP: Pema Parra MD    Date of office visit: 2022   Date of admission: 3/28/22  Date of discharge: 3/29/22  Hospital: [facility]    Call initiated w/i 2 business dates of discharge: Yes   Date of the most recent call to the patient: 3/30/2022 11:48 AM        Assessment/Plan:   Diagnoses and all orders for this visit:    1. Pneumonia of right lower lobe due to infectious organism  -     XR CHEST PA LAT; Future  -     PA DISCHARGE MEDS RECONCILED W/ CURRENT OUTPATIENT MED LIST    2. Imbalance  -     REFERRAL TO PHYSICAL THERAPY  -     PA DISCHARGE MEDS RECONCILED W/ CURRENT OUTPATIENT MED LIST    3. Muscular deconditioning  -     REFERRAL TO PHYSICAL THERAPY  -     PA DISCHARGE MEDS RECONCILED W/ CURRENT OUTPATIENT MED LIST    4. Benign essential hypertension  -     losartan (COZAAR) 100 mg tablet; Take 1 Tablet by mouth daily. 5. Depression with anxiety  -     citalopram (CELEXA) 20 mg tablet; Take 1 Tablet by mouth daily. 6. Hyperlipidemia, unspecified hyperlipidemia type  -     pravastatin (PRAVACHOL) 20 mg tablet; Take 1 tablet by mouth nightly    7. Moderate persistent asthma without complication  -     albuterol (ProAir HFA) 90 mcg/actuation inhaler; Take 2 Puffs by inhalation every six (6) hours as needed for Wheezing.  -     beclomethasone (Qvar) 40 mcg/actuation aero; Take 1 Puff by inhalation two (2) times a day. 8. Atrial fibrillation, unspecified type (HCC)  -     metoprolol succinate (TOPROL-XL) 100 mg tablet; Take 1 Tablet by mouth nightly. [address] discharge follow-up  -     PA DISCHARGE MEDS RECONCILED W/ CURRENT OUTPATIENT MED LIST    10. Community acquired pneumonia of right lower lobe of lung    11.  Atrial fibrillation with RVR (HCC)    Pt seen today in dc follow up  Doing much better  Change toprol xl from 2 50mg tabs to 1 100mg tab for streamlining  Refill advair and qvar as above (seasonally allergies cause worsening of asthma symptoms)  Other medicines continued per orders above, stable  She is improved and stable  Keep f/u with specialists  Elevated bg at hosp likely r/t iatrogenic causes, IFG on a1c at 5.9  Transition if possible to pt in community setting from home PT, pt feels ready to be out of the home and is supported by family  Likely can d/c skilled nursing given this  F/u xr when coming back to the hospital to see cardiology per orders (recommended on imaging per radiology given characteristics of pneumonia)  No longer using daytime oxygen, does have nighttime for now, likely to dc soon, she is tolerating pt without o2 well         Subjective:   Brittany Grant is a 69 y.o. female presenting today for follow-up after hospital discharge. This encounter and supporting documentation was reviewed if available. Medication reconciliation was performed today. The main problem requiring admission was AHRF d/t RLL PNA and exacerbation of AFIB w RVR. Complications during admission: none      Interval history/Current status: doing much better  Completed abx  Completed steroids  Improving in PT--would like to transition to community pt from [facility] if possible    Needs qvar filled  Needs alb filled  Needs all meds otherwise refilled    Needs f/u xr     Admitting symptoms have: significantly improved      Medications marked \"taking\" at this time:  Home Medications    Medication Sig Start Date End Date Taking? Authorizing Provider   losartan (COZAAR) 100 mg tablet Take 1 Tablet by mouth daily. 4/11/22  Yes Jaycee Crowe MD   citalopram (CELEXA) 20 mg tablet Take 1 Tablet by mouth daily. 4/11/22  Yes Jaycee Crowe MD   pravastatin (PRAVACHOL) 20 mg tablet Take 1 tablet by mouth nightly 4/11/22  Yes Jaycee Crowe MD   albuterol (ProAir HFA) 90 mcg/actuation inhaler Take 2 Puffs by inhalation every six (6) hours as needed for Wheezing. 4/11/22  Yes Bunny Reyes MD   metoprolol succinate (TOPROL-XL) 100 mg tablet Take 1 Tablet by mouth nightly. 4/11/22  Yes Bunny Reyes MD   beclomethasone (Qvar) 40 mcg/actuation aero Take 1 Puff by inhalation two (2) times a day. 4/11/22  Yes Bunny Reyes MD   oxygen-air delivery systems (PV CLASSIC OXYGEN CONCENTRATOR) 2 L/min by Nasal route continuous. Yes Provider, Historical   guaiFENesin (Mucinex) 1,200 mg Ta12 ER tablet Take 1,200 mg by mouth two (2) times a day. 4/5/22  Yes Bunny Reyes MD   loratadine (Claritin) 10 mg tablet Take 10 mg by mouth daily. 4/5/22  Yes Bunny Reyes MD   sodium chloride (SALINE NASAL NA) 2 Squirts by Nasal route as needed (dry nose). 4/5/22  Yes Bunny Reyes MD   warfarin (COUMADIN) 5 mg tablet Take 5 mg by mouth five (5) days a week. Mon-Fri. None on Sa, [place]. patient self monitors INR, enters result in application and is reviewed by Dr. Dori Quan and is due 4/13/22. Yes Provider, Historical   hydroCHLOROthiazide (HYDRODIURIL) 25 mg tablet Take 1 Tablet by mouth daily. 11/8/21  Yes Bunny Reyes MD   cholecalciferol, vitamin D3, (VITAMIN D3 PO) Take 1 Tablet by mouth daily. Yes Provider, Historical   metoprolol succinate (TOPROL-XL) 50 mg XL tablet Take 100 mg by mouth nightly. 4/11/22  Provider, Historical   losartan (COZAAR) 100 mg tablet Take 1 tablet by mouth once daily 3/11/22 4/11/22  Bunny Reyes MD   albuterol (ProAir HFA) 90 mcg/actuation inhaler Take 2 Puffs by inhalation every six (6) hours as needed for Wheezing. 12/13/21 4/11/22  Bunny Reyes MD   pravastatin (PRAVACHOL) 20 mg tablet Take 1 tablet by mouth nightly 5/3/21 4/11/22  Bunny Reyes MD   citalopram (CELEXA) 20 mg tablet Take 1 Tab by mouth daily. 5/3/21 4/11/22  Bunny Reyes MD        Review of Systems:  A 12 point review of systems was negative except as noted here or in the HPI.      Objective:     Patient-Reported Vitals 4/9/2022 Patient-Reported Weight 175   Patient-Reported Height 4'10\"   Patient-Reported Pulse 85   Patient-Reported Temperature 97.7   Patient-Reported SpO2 98   Patient-Reported Systolic  91   Patient-Reported Diastolic 60      General: alert, cooperative, no distress, NOT wearing oxygen   Mental  status: normal mood, behavior, speech, dress, motor activity, and thought processes, able to follow commands   HENT: NCAT   Neck: no visualized mass   Resp: no respiratory distress   Neuro: no gross deficits   Skin: no discoloration or lesions of concern on visible areas   Psychiatric: normal affect, consistent with stated mood, no evidence of hallucinations     Additional exam findings: We discussed the expected course, resolution and complications of the diagnosis(es) in detail. Medication risks, benefits, costs, interactions, and alternatives were discussed as indicated. I advised her to contact the office if her condition worsens, changes or fails to improve as anticipated. She expressed understanding with the diagnosis(es) and plan. David Chung, who was evaluated through a synchronous (real-time) audio-video encounter and/or her healthcare decision maker, is aware that it is a billable service, with coverage as determined by her insurance carrier. She provided verbal consent to proceed: Yes, and patient identification was verified. It was conducted pursuant to the emergency declaration under the [address] [place] [address] and the [employer]. A caregiver was present when appropriate. Ability to conduct physical exam was limited. I was at home. The patient was at home.       Gavin Giles MD

## 2022-04-12 PROCEDURE — 3331090002 HH PPS REVENUE DEBIT

## 2022-04-12 PROCEDURE — 3331090001 HH PPS REVENUE CREDIT

## 2022-04-13 ENCOUNTER — HOME CARE VISIT (OUTPATIENT)
Dept: HOME HEALTH SERVICES | Facility: HOME HEALTH | Age: 77
End: 2022-04-13
Payer: MEDICARE

## 2022-04-13 ENCOUNTER — PATIENT OUTREACH (OUTPATIENT)
Dept: CASE MANAGEMENT | Age: 77
End: 2022-04-13

## 2022-04-13 ENCOUNTER — TELEPHONE (OUTPATIENT)
Dept: FAMILY MEDICINE CLINIC | Age: 77
End: 2022-04-13

## 2022-04-13 PROCEDURE — 3331090002 HH PPS REVENUE DEBIT

## 2022-04-13 PROCEDURE — 3331090001 HH PPS REVENUE CREDIT

## 2022-04-13 NOTE — TELEPHONE ENCOUNTER
----- Message from Alysha Hay MD sent at 4/11/2022 12:07 PM EDT -----  Regarding: handicap paperwork  [please prepare handicap paperwork and put on my desk for signature  Pt to  when ready  For 6mos

## 2022-04-14 PROCEDURE — 3331090002 HH PPS REVENUE DEBIT

## 2022-04-14 PROCEDURE — 3331090001 HH PPS REVENUE CREDIT

## 2022-04-15 ENCOUNTER — HOME CARE VISIT (OUTPATIENT)
Dept: SCHEDULING | Facility: HOME HEALTH | Age: 77
End: 2022-04-15
Payer: MEDICARE

## 2022-04-15 ENCOUNTER — TELEPHONE (OUTPATIENT)
Dept: FAMILY MEDICINE CLINIC | Age: 77
End: 2022-04-15

## 2022-04-15 VITALS
HEART RATE: 108 BPM | TEMPERATURE: 98 F | RESPIRATION RATE: 18 BRPM | DIASTOLIC BLOOD PRESSURE: 84 MMHG | OXYGEN SATURATION: 98 % | SYSTOLIC BLOOD PRESSURE: 138 MMHG

## 2022-04-15 PROCEDURE — G0151 HHCP-SERV OF PT,EA 15 MIN: HCPCS

## 2022-04-15 PROCEDURE — 3331090002 HH PPS REVENUE DEBIT

## 2022-04-15 PROCEDURE — 3331090001 HH PPS REVENUE CREDIT

## 2022-04-15 PROCEDURE — G0299 HHS/HOSPICE OF RN EA 15 MIN: HCPCS

## 2022-04-15 NOTE — TELEPHONE ENCOUNTER
Message from [address]:    Qvar Redhaler 40 mcg aerosol inhaler not covered by [employer] and requires Prior Auth. Member covered under [employer]  ID [id number]    Insurance Contact # [phone].  [place]

## 2022-04-16 PROCEDURE — 3331090001 HH PPS REVENUE CREDIT

## 2022-04-16 PROCEDURE — 3331090002 HH PPS REVENUE DEBIT

## 2022-04-17 VITALS
OXYGEN SATURATION: 98 % | SYSTOLIC BLOOD PRESSURE: 130 MMHG | TEMPERATURE: 97.7 F | HEART RATE: 76 BPM | DIASTOLIC BLOOD PRESSURE: 76 MMHG | RESPIRATION RATE: 18 BRPM

## 2022-04-17 DIAGNOSIS — I10 BENIGN ESSENTIAL HYPERTENSION: ICD-10-CM

## 2022-04-17 PROCEDURE — 3331090001 HH PPS REVENUE CREDIT

## 2022-04-17 PROCEDURE — 3331090002 HH PPS REVENUE DEBIT

## 2022-04-18 PROCEDURE — 3331090002 HH PPS REVENUE DEBIT

## 2022-04-18 PROCEDURE — 3331090001 HH PPS REVENUE CREDIT

## 2022-04-18 RX ORDER — HYDROCHLOROTHIAZIDE 25 MG/1
TABLET ORAL
Qty: 90 TABLET | Refills: 0 | Status: SHIPPED | OUTPATIENT
Start: 2022-04-18 | End: 2022-06-11 | Stop reason: SDUPTHER

## 2022-04-20 ENCOUNTER — PATIENT OUTREACH (OUTPATIENT)
Dept: CASE MANAGEMENT | Age: 77
End: 2022-04-20

## 2022-04-20 NOTE — PROGRESS NOTES
Care Transitions Follow Up Call    Care Transition Nurse (CTN) contacted the patient by telephone to follow up after admission on 3/28/2022. Reports doing well. No concerns. Reports BPs average 110s/60-70s. Denies lightheadedness, dizziness. Reports no s.e to new medications. Patient will monitor BP and if BPs continue to be low and is symptomatic, patient will notify pcp. Addressed changes since last contact: medications- BP  Follow up appointment completed? yes. Was follow up appointment scheduled within 7 days of discharge? no.     CTN reviewed medical action plan and red flags with patient and discussed any barriers to care and/or understanding of plan of care after discharge. Discussed appropriate site of care based on symptoms and resources available to patient including: PCP. The patient agrees to contact the PCP office for questions related to their healthcare. Patients top risk factors for readmission: GISELA   Interventions to address risk factors: [facility] follow up appointment(s):   Future Appointments   Date Time Provider Kary [place]   5[facility]2022 12:30 [facility]   5/24/2022 11:00 AM Chaz Cabrera MD [facility] BS AMB   9/2/2022 11:00 AM Neena Darling MD [facility] BS AMB     [facility] follow up appointment(s):     CTN provided contact information for future needs. Plan for follow-up call in 7-10 days based on severity of symptoms and risk factors. Plan for next call: self management-BP     Goals Addressed                 This Visit's Progress     COMPLETED: Instruct on red flags (ie. fever, increased productive cough, SOB, and chest pain) and when to seek urgent medical treatment.  Prevent complications post hospitalization.           03/30/22   Will not fall during TERRI episode   Will complete abx therapy   Will attend follow up appt with PCP scheduled 4/1   Pt will remain out of the hospital or ER for remainder of TERRI period    04/20/22    Completed abx   Attended appt with PCP   Will perform home BP monitoring with tracking and provide reading during next CTN call

## 2022-04-26 ENCOUNTER — TELEPHONE ANTICOAG (OUTPATIENT)
Dept: CARDIOLOGY CLINIC | Age: 77
End: 2022-04-26

## 2022-04-26 DIAGNOSIS — I48.0 PAROXYSMAL ATRIAL FIBRILLATION (HCC): Primary | ICD-10-CM

## 2022-04-26 LAB — INR, EXTERNAL: 2.4 (ref 2–3)

## 2022-04-27 DIAGNOSIS — E78.5 HYPERLIPIDEMIA, UNSPECIFIED HYPERLIPIDEMIA TYPE: ICD-10-CM

## 2022-04-28 RX ORDER — PRAVASTATIN SODIUM 20 MG/1
TABLET ORAL
Qty: 90 TABLET | Refills: 0 | Status: SHIPPED | OUTPATIENT
Start: 2022-04-28 | End: 2022-05-24

## 2022-04-29 ENCOUNTER — PATIENT OUTREACH (OUTPATIENT)
Dept: CASE MANAGEMENT | Age: 77
End: 2022-04-29

## 2022-04-29 NOTE — PROGRESS NOTES
Patient has graduated from the Transitions of Care Coordination  program on 4/29/2022. Patient/family has the ability to self-manage at this time Care management goals have been completed. Patient was not referred to the [facility] team for further management. Goals Addressed                 This Visit's Progress     COMPLETED: Prevent complications post hospitalization. 03/30/22   Will not fall during TERRI episode   Will complete abx therapy   Will attend follow up appt with PCP scheduled 4/1   Pt will remain out of the hospital or ER for remainder of TERRI period    04/20/22    Completed abx   Attended appt with PCP   Will perform home BP monitoring with tracking and provide reading during next CTN call            Patient has Care Transition Nurse's contact information for any further questions, concerns, or needs. Patients upcoming visits:    Future Appointments   Date Time Provider Kary [place]   5/9/2022 12:30 PM [facility] [address] [facility]   5/24/2022 11:00 AM Cheryl Donovan MD [facility] BS AMB   9/2/2022 11:00 AM Gal Molina MD [facility] BS AMB

## 2022-05-03 LAB — INR, EXTERNAL: 3.2 (ref 2–3)

## 2022-05-04 ENCOUNTER — TELEPHONE ANTICOAG (OUTPATIENT)
Dept: CARDIOLOGY CLINIC | Age: 77
End: 2022-05-04

## 2022-05-04 DIAGNOSIS — I48.0 PAROXYSMAL ATRIAL FIBRILLATION (HCC): Primary | ICD-10-CM

## 2022-05-07 DIAGNOSIS — I48.91 ATRIAL FIBRILLATION, UNSPECIFIED TYPE (HCC): ICD-10-CM

## 2022-05-09 ENCOUNTER — HOSPITAL ENCOUNTER (OUTPATIENT)
Dept: MAMMOGRAPHY | Age: 77
Discharge: HOME | End: 2022-05-09
Attending: FAMILY MEDICINE
Payer: MEDICARE

## 2022-05-09 DIAGNOSIS — Z12.31 VISIT FOR SCREENING MAMMOGRAM: ICD-10-CM

## 2022-05-09 PROCEDURE — 77067 SCR MAMMO BI INCL CAD: CPT

## 2022-05-09 RX ORDER — METOPROLOL SUCCINATE 100 MG/1
100 TABLET, EXTENDED RELEASE ORAL
Qty: 90 TABLET | Refills: 3 | Status: SHIPPED | OUTPATIENT
Start: 2022-05-09 | End: 2022-05-24 | Stop reason: SDUPTHER

## 2022-05-15 ENCOUNTER — HOSPITAL ENCOUNTER (EMERGENCY)
Dept: GENERAL RADIOLOGY | Age: 77
Discharge: HOME | End: 2022-05-15
Attending: EMERGENCY MEDICINE
Payer: MEDICARE

## 2022-05-15 ENCOUNTER — HOSPITAL ENCOUNTER (EMERGENCY)
Age: 77
Discharge: HOME | End: 2022-05-15
Attending: EMERGENCY MEDICINE | Admitting: EMERGENCY MEDICINE
Payer: MEDICARE

## 2022-05-15 VITALS
DIASTOLIC BLOOD PRESSURE: 84 MMHG | HEART RATE: 103 BPM | HEIGHT: 59 IN | SYSTOLIC BLOOD PRESSURE: 141 MMHG | OXYGEN SATURATION: 94 % | WEIGHT: 187.17 LBS | RESPIRATION RATE: 18 BRPM | BODY MASS INDEX: 37.73 KG/M2 | TEMPERATURE: 98.4 F

## 2022-05-15 DIAGNOSIS — S42.225A CLOSED 2-PART NONDISPLACED FRACTURE OF SURGICAL NECK OF LEFT HUMERUS, INITIAL ENCOUNTER: Primary | ICD-10-CM
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
## 2022-05-15 PROCEDURE — 73030 X-RAY EXAM OF SHOULDER: CPT

## 2022-05-15 PROCEDURE — 99283 EMERGENCY DEPT VISIT LOW MDM: CPT

## 2022-05-15 PROCEDURE — 74011250637 HC RX REV CODE- 250/637: Performed by: EMERGENCY MEDICINE

## 2022-05-15 PROCEDURE — 73060 X-RAY EXAM OF HUMERUS: CPT

## 2022-05-15 RX ORDER — HYDROCODONE BITARTRATE AND ACETAMINOPHEN 5; 325 MG/1; MG/1
1 TABLET ORAL ONCE
Status: COMPLETED | OUTPATIENT
Start: 2022-05-15 | End: 2022-05-15

## 2022-05-15 RX ORDER — HYDROCODONE BITARTRATE AND ACETAMINOPHEN 5; 325 MG/1; MG/1
1 TABLET ORAL
Qty: 12 TABLET | Refills: 0 | Status: SHIPPED | OUTPATIENT
Start: 2022-05-15 | End: 2022-05-18

## 2022-05-15 RX ADMIN — HYDROCODONE BITARTRATE AND ACETAMINOPHEN 1 TABLET: 5; 325 TABLET ORAL at 19:17

## 2022-05-15 NOTE — ED TRIAGE NOTES
Pt ambulates to treatment area she states that she was walking out of the bedroom and slipped on the rug in the foster falling directly on her left arm. She has pain to mid left arm.   She is not able to move the arm much, she has ice applied to the area

## 2022-05-15 NOTE — ED NOTES
Verbal shift change report given to Manuel Cox RN (oncoming nurse) by Gerson Rothman (offgoing nurse). Report included the following information SBAR, Kardex, ED Summary, [facility] and Recent Results.

## 2022-05-15 NOTE — ED NOTES
Discharge instructions provided. Patient verbalized understanding and opportunity for questions was given. Patient left ED via wheelchair accompanied by family member in no obvious distress.

## 2022-05-16 NOTE — ED PROVIDER NOTES
60-year-old female with history as below presents to the emergency department stating that she slipped on a rug in the ofster walking out of the bathroom and landed on her left upper arm/shoulder. She notes pain in her left shoulder/humerus with associated decreased range of motion. She applied ice to the area but has not taken anything for pain. She denies any distal numbness or weakness. She denies any history of prior fractures or surgeries to this area. She denies any head injury or LOC or any other injury sustained in the fall. Past Medical History:   Diagnosis Date    Arthritis     Asthma     Atrial fibrillation (HCC)     Afib discovered 8/21 - asymptomatic     Calculus of kidney     Congestive heart failure (HCC)     Depression with anxiety     Hypertension        Past Surgical History:   Procedure Laterality Date    HX HYSTERECTOMY  1990    For fibroids         Family History:   Problem Relation Age of Onset    Cancer Mother     Breast Cancer Mother     Stroke Father     Heart Disease Father     Breast Cancer Niece     Uterine Cancer Sister        Social History     Socioeconomic History    Marital status:      Spouse name: Not on file    Number of children: Not on file    Years of education: Not on file    Highest education level: Not on file   Occupational History    Not on file   Tobacco Use    Smoking status: Never Smoker    Smokeless tobacco: Never Used   Vaping Use    Vaping Use: Never used   Substance and Sexual Activity    Alcohol use:  Yes    Drug use: Never    Sexual activity: Not Currently   Other Topics Concern    Not on file   Social History Narrative    Not on file     Social Determinants of Health     Financial Resource Strain:     Difficulty of Paying Living Expenses: Not on file   Food Insecurity:     Worried About Running Out of Food in the Last Year: Not on file    Izabella of Food in the Last Year: Not on file   Transportation Needs:     Lack of Transportation (Medical): Not on file    Lack of Transportation (Non-Medical): Not on file   Physical Activity:     Days of Exercise per Week: Not on file    Minutes of Exercise per Session: Not on file   Stress:     Feeling of Stress : Not on file   Social Connections:     Frequency of Communication with Friends and Family: Not on file    Frequency of Social Gatherings with Friends and Family: Not on file    Attends Zoroastrianism Services: Not on file    Active Member of [address] [place] [address] or Organizations: Not on file    Attends Club or Organization Meetings: Not on file    Marital Status: Not on file   Intimate Partner Violence:     Fear of Current or Ex-Partner: Not on file    Emotionally Abused: Not on file    Physically Abused: Not on file    Sexually Abused: Not on file   Housing Stability:     Unable to Pay for Housing in the Last Year: Not on file    Number of [place] in the Last Year: Not on file    Unstable Housing in the Last Year: Not on file         ALLERGIES: Patient has no known allergies. Review of Systems   Constitutional: Negative for activity change, appetite change, chills and fever. HENT: Negative for congestion, rhinorrhea, sinus pressure, sneezing and sore throat. Eyes: Negative for photophobia and visual disturbance. Respiratory: Negative for cough and shortness of breath. Cardiovascular: Negative for chest pain. Gastrointestinal: Negative for abdominal pain, blood in stool, constipation, diarrhea, nausea and vomiting. Genitourinary: Negative for difficulty urinating, dysuria, flank pain, frequency, hematuria, menstrual problem, urgency, vaginal bleeding and vaginal discharge. Musculoskeletal: Positive for arthralgias (Left shoulder/humerus). Negative for back pain, myalgias and neck pain. Skin: Negative for rash and wound. Neurological: Negative for syncope, weakness, numbness and headaches.    Psychiatric/Behavioral: Negative for self-injury and suicidal ideas. All other systems reviewed and are negative. Vitals:    05/15/22 1820 05/15/22 1822 05/15/22 1823   BP: (!) 141/84     Pulse: (!) 110  (!) 103   Resp: 18     Temp: 98.4 °F (36.9 °C)     SpO2: 94% 96% 94%   Weight: 84.9 kg (187 lb 2.7 oz)     Height: 4' 11\" (1.499 m)              Physical Exam  Vitals and nursing note reviewed. Constitutional:       General: She is not in acute distress. Appearance: Normal appearance. She is well-developed. She is obese. She is not diaphoretic. Comments: Very pleasant, no acute distress. HENT:      Head: Normocephalic and atraumatic. Nose: Nose normal.   Eyes:      Extraocular Movements: Extraocular movements intact. Conjunctiva/sclera: Conjunctivae normal.      Pupils: Pupils are equal, round, and reactive to light. Cardiovascular:      Rate and Rhythm: Normal rate and regular rhythm. Heart sounds: Normal heart sounds. Pulmonary:      Effort: Pulmonary effort is normal.      Breath sounds: Normal breath sounds. Abdominal:      General: There is no distension. Palpations: Abdomen is soft. Tenderness: There is no abdominal tenderness. Musculoskeletal:         General: Tenderness and signs of injury present. Left shoulder: Tenderness, bony tenderness and crepitus present. No deformity or laceration. Decreased range of motion. Normal strength. Normal pulse. Left upper arm: Tenderness and bony tenderness present. No deformity. Left wrist: Normal. Normal pulse. Cervical back: Neck supple. Skin:     General: Skin is warm and dry. Neurological:      General: No focal deficit present. Mental Status: She is alert and oriented to person, place, and time. Cranial Nerves: No cranial nerve deficit. Sensory: No sensory deficit. Motor: No weakness. Coordination: Coordination normal.          MDM   78-year-old female presents with GL F landing on left shoulder/humerus.   Exam concerning for proximal humerus fracture. X-rays were reviewed by myself and read by radiology showing nondisplaced humeral neck fracture. She is placed in sling and given Norco for pain and Rx for same. Recommended orthopedic follow-up for further evaluation and return precautions were given for worsening or concerns. This plan was discussed with the patient at the bedside and she stated both understanding and agreement. Please note that this dictation was completed with [employer], the computer voice recognition software. Quite often unanticipated grammatical, syntax, homophones, and other interpretive errors are inadvertently transcribed by the computer software. Please disregard these errors. Please excuse any errors that have escaped final proofreading.       Procedures

## 2022-05-18 ENCOUNTER — TELEPHONE (OUTPATIENT)
Dept: CARDIOLOGY CLINIC | Age: 77
End: 2022-05-18

## 2022-05-18 LAB — INR, EXTERNAL: 6.2 (ref 2–3)

## 2022-05-18 NOTE — TELEPHONE ENCOUNTER
Call center reached out to me about a critical INR, however the line was disconnected prior to being able to speak with someone.

## 2022-05-19 ENCOUNTER — TELEPHONE ANTICOAG (OUTPATIENT)
Dept: CARDIOLOGY CLINIC | Age: 77
End: 2022-05-19

## 2022-05-19 ENCOUNTER — PATIENT OUTREACH (OUTPATIENT)
Dept: CASE MANAGEMENT | Age: 77
End: 2022-05-19

## 2022-05-19 DIAGNOSIS — I48.0 PAROXYSMAL ATRIAL FIBRILLATION (HCC): Primary | ICD-10-CM

## 2022-05-19 NOTE — PROGRESS NOTES
05/19/22  12:32 PM  Ambulatory Care Management Note    Date/Time:  5/19/2022 12:32 PM    This patient was received as a referral from Daily assignment. Ambulatory Care Manager outreached to patient today to offer care management services. Introduction to self and role of care manager provided. Patient declined care management services at this time. No follow up call scheduled at this time. Patient has Ambulatory Care Manager's contact number for for any questions or concerns. Patient recently graduated from a TERRI episode 4/29/22, she reports went to the ER d/t fall/fracture, but otherwise is doing well. Doesn't feel the need for Care Management at this time.

## 2022-05-24 ENCOUNTER — HOSPITAL ENCOUNTER (OUTPATIENT)
Dept: GENERAL RADIOLOGY | Age: 77
Discharge: HOME | End: 2022-05-24
Payer: MEDICARE

## 2022-05-24 ENCOUNTER — OFFICE VISIT (OUTPATIENT)
Dept: CARDIOLOGY CLINIC | Age: 77
End: 2022-05-24
Payer: MEDICARE

## 2022-05-24 ENCOUNTER — ANTI-COAG VISIT (OUTPATIENT)
Dept: CARDIOLOGY CLINIC | Age: 77
End: 2022-05-24
Payer: MEDICARE

## 2022-05-24 VITALS
SYSTOLIC BLOOD PRESSURE: 138 MMHG | OXYGEN SATURATION: 99 % | WEIGHT: 186 LBS | DIASTOLIC BLOOD PRESSURE: 88 MMHG | HEIGHT: 59 IN | HEART RATE: 91 BPM | BODY MASS INDEX: 37.5 KG/M2

## 2022-05-24 DIAGNOSIS — J18.9 PNEUMONIA OF RIGHT LOWER LOBE DUE TO INFECTIOUS ORGANISM: ICD-10-CM

## 2022-05-24 DIAGNOSIS — I48.0 PAROXYSMAL ATRIAL FIBRILLATION (HCC): Primary | ICD-10-CM

## 2022-05-24 DIAGNOSIS — I10 PRIMARY HYPERTENSION: ICD-10-CM

## 2022-05-24 DIAGNOSIS — I48.91 ATRIAL FIBRILLATION, UNSPECIFIED TYPE (HCC): ICD-10-CM

## 2022-05-24 DIAGNOSIS — E78.5 HYPERLIPIDEMIA, UNSPECIFIED HYPERLIPIDEMIA TYPE: ICD-10-CM

## 2022-05-24 DIAGNOSIS — I35.1 AORTIC VALVE INSUFFICIENCY, ETIOLOGY OF CARDIAC VALVE DISEASE UNSPECIFIED: ICD-10-CM

## 2022-05-24 DIAGNOSIS — I48.21 PERMANENT ATRIAL FIBRILLATION (HCC): Primary | ICD-10-CM

## 2022-05-24 DIAGNOSIS — I34.0 MITRAL VALVE INSUFFICIENCY, UNSPECIFIED ETIOLOGY: ICD-10-CM

## 2022-05-24 LAB
INR BLD: 2.1
INR, EXTERNAL: 2.1 (ref 2–3)
PT POC: 25.2 SECONDS
VALID INTERNAL CONTROL?: YES

## 2022-05-24 PROCEDURE — 71046 X-RAY EXAM CHEST 2 VIEWS: CPT

## 2022-05-24 PROCEDURE — G8754 DIAS BP LESS 90: HCPCS | Performed by: SPECIALIST

## 2022-05-24 PROCEDURE — 99214 OFFICE O/P EST MOD 30 MIN: CPT | Performed by: SPECIALIST

## 2022-05-24 PROCEDURE — G8536 NO DOC ELDER MAL SCRN: HCPCS | Performed by: SPECIALIST

## 2022-05-24 PROCEDURE — G9717 DOC PT DX DEP/BP F/U NT REQ: HCPCS | Performed by: SPECIALIST

## 2022-05-24 PROCEDURE — 1101F PT FALLS ASSESS-DOCD LE1/YR: CPT | Performed by: SPECIALIST

## 2022-05-24 PROCEDURE — 1123F ACP DISCUSS/DSCN MKR DOCD: CPT | Performed by: SPECIALIST

## 2022-05-24 PROCEDURE — G8399 PT W/DXA RESULTS DOCUMENT: HCPCS | Performed by: SPECIALIST

## 2022-05-24 PROCEDURE — G8427 DOCREV CUR MEDS BY ELIG CLIN: HCPCS | Performed by: SPECIALIST

## 2022-05-24 PROCEDURE — G8417 CALC BMI ABV UP PARAM F/U: HCPCS | Performed by: SPECIALIST

## 2022-05-24 PROCEDURE — G8752 SYS BP LESS 140: HCPCS | Performed by: SPECIALIST

## 2022-05-24 PROCEDURE — G0463 HOSPITAL OUTPT CLINIC VISIT: HCPCS | Performed by: SPECIALIST

## 2022-05-24 PROCEDURE — 1090F PRES/ABSN URINE INCON ASSESS: CPT | Performed by: SPECIALIST

## 2022-05-24 PROCEDURE — 85610 PROTHROMBIN TIME: CPT | Performed by: SPECIALIST

## 2022-05-24 RX ORDER — METOPROLOL SUCCINATE 100 MG/1
150 TABLET, EXTENDED RELEASE ORAL
Qty: 135 TABLET | Refills: 3 | Status: SHIPPED | OUTPATIENT
Start: 2022-05-24

## 2022-05-24 RX ORDER — ROSUVASTATIN CALCIUM 10 MG/1
10 TABLET, COATED ORAL
Qty: 90 TABLET | Refills: 3 | Status: SHIPPED | OUTPATIENT
Start: 2022-05-24

## 2022-05-24 RX ORDER — GUAIFENESIN 1200 MG
TABLET, EXTENDED RELEASE 12 HR ORAL
COMMUNITY

## 2022-05-24 NOTE — PROGRESS NOTES
Chief Complaint   Patient presents with    Follow-up     6 months   [facility] Follow Up     ED 5/15 for a fall     Visit Vitals  /88 (BP 1 Location: Left upper arm)   Pulse 91   Ht 4' 11\" (1.499 m)   Wt 186 lb (84.4 kg)   SpO2 99%   BMI 37.57 kg/m²     Chest pain denied   SOB Yes   Palpitations denied   Swelling in hands/feet Feet yes.    Dizziness denied   Recent hospital stays denied   Refills denied

## 2022-05-24 NOTE — PROGRESS NOTES
Reji Summers MD. [facility] [place]              Patient: Zac Zavala  : 1945      Today's Date: 2022            HISTORY OF PRESENT ILLNESS:     History of Present Illness:    Has chronic class 2 DAVENPORT. She has no palpitations - she feels at her baseline. She tripped and fell and broke her left arm - some bruising with that. She was feeling well prior to the fall. Had PNA last month - doing better. PAST MEDICAL HISTORY:     Past Medical History:   Diagnosis Date    Arthritis     Asthma     Atrial fibrillation ([address]     Afib discovered  - asymptomatic     Calculus of kidney     Congestive heart failure (HCC)     Depression with anxiety     Hypertension            MEDICATIONS:     Current Outpatient Medications   Medication Sig Dispense Refill    acetaminophen 325 mg cap Take  by mouth.  metoprolol succinate (TOPROL-XL) 100 mg tablet Take 1 Tablet by mouth nightly. 90 Tablet 3    pravastatin (PRAVACHOL) 20 mg tablet Take 1 tablet by mouth nightly 90 Tablet 0    hydroCHLOROthiazide (HYDRODIURIL) 25 mg tablet Take 1 tablet by mouth once daily 90 Tablet 0    losartan (COZAAR) 100 mg tablet Take 1 Tablet by mouth daily. 90 Tablet 3    citalopram (CELEXA) 20 mg tablet Take 1 Tablet by mouth daily. 90 Tablet 3    albuterol (ProAir HFA) 90 mcg/actuation inhaler Take 2 Puffs by inhalation every six (6) hours as needed for Wheezing. 1 Each 2    beclomethasone (Qvar) 40 mcg/actuation aero Take 1 Puff by inhalation two (2) times a day. 1 Each 3    loratadine (Claritin) 10 mg tablet Take 10 mg by mouth daily.  sodium chloride (SALINE NASAL NA) 2 Squirts by Nasal route as needed (dry nose).  warfarin (COUMADIN) 5 mg tablet Take 5 mg by mouth five (5) days a week. Mon-Fri. None on [place], [place]. patient self monitors INR, enters result in application and is reviewed by Dr. Tsering Esteves and is due 22.       cholecalciferol, vitamin D3, (VITAMIN D3 PO) Take 1 Tablet by mouth daily.      guaiFENesin (Mucinex) 1,200 mg Ta12 ER tablet Take 1,200 mg by mouth two (2) times a day. (Patient not taking: Reported on 5/24/2022)         No Known Allergies          SOCIAL HISTORY:     Social History     Tobacco Use    Smoking status: Never Smoker    Smokeless tobacco: Never Used   Vaping Use    Vaping Use: Never used   Substance Use Topics    Alcohol use: Yes    Drug use: Never         FAMILY HISTORY:     Family History   Problem Relation Age of Onset    Cancer Mother     Breast Cancer Mother     Stroke Father     Heart Disease Father     Breast Cancer Niece     Uterine Cancer Sister            REVIEW OF SYMPTOMS:     Review of Symptoms:  Constitutional: Negative for fever, chills  HEENT: Negative for nosebleeds, tinnitus, and vision changes. Respiratory: Negative for cough, wheezing  Cardiovascular: Negative for orthopnea, claudication, syncope, and PND. Gastrointestinal: Negative for abdominal pain, diarrhea, melena. Genitourinary: Negative for dysuria  Musculoskeletal: Negative for myalgias. Skin: Negative for rash  Heme: No problems bleeding. Neurological: Negative for speech change and focal weakness. PHYSICAL EXAM:     Physical Exam:  Visit Vitals  /88 (BP 1 Location: Left upper arm, BP Patient Position: Sitting)   Pulse 91   Ht 4' 11\" (1.499 m)   Wt 186 lb (84.4 kg)   SpO2 99%   BMI 37.57 kg/m²      Patient appears generally well, mood and affect are appropriate and pleasant. HEENT:  Hearing intact, non-icteric, normocephalic, atraumatic. Neck Exam: Supple, No JVD or carotid bruits. Lung Exam: Clear to auscultation, even breath sounds. Cardiac Exam: Irregularly, irregular with no murmur or rub  Abdomen: Soft, non-tender, normal bowel sounds. Obesity   Extremities: MAW, No lower extremity edema.   MSKTL: Overall good ROM ext  Skin: No significant rashes  Psych: Appropriate affect  Neuro - Grossly intact          LABS / OTHER STUDIES reviewed:     Lab Results   Component Value Date/Time    Sodium 138 03/29/2022 12:22 AM    Potassium 4.0 03/29/2022 12:22 AM    Chloride 101 03/29/2022 12:22 AM    CO2 31 03/29/2022 12:22 AM    Anion gap 6 03/29/2022 12:22 AM    Glucose 170 (H) 03/29/2022 12:22 AM    BUN 18 03/29/2022 12:22 AM    Creatinine 0.82 03/29/2022 12:22 AM    BUN/Creatinine ratio 22 (H) 03/29/2022 12:22 AM    GFR est AA >60 03/29/2022 12:22 AM    GFR est non-AA >60 03/29/2022 12:22 AM    Calcium 8.4 (L) 03/29/2022 12:22 AM    Bilirubin, total 1.2 (H) 03/28/2022 05:28 AM    Alk. phosphatase 74 03/28/2022 05:28 AM    Protein, total 7.6 03/28/2022 05:28 AM    Albumin 3.3 (L) 03/28/2022 05:28 AM    Globulin 4.3 (H) 03/28/2022 05:28 AM    A-G Ratio 0.8 (L) 03/28/2022 05:28 AM    ALT (SGPT) 42 03/28/2022 05:28 AM    AST (SGOT) 41 (H) 03/28/2022 05:28 AM     Lab Results   Component Value Date/Time    WBC 11.3 (H) 03/29/2022 12:22 AM    HGB 10.9 (L) 03/29/2022 12:22 AM    HCT 33.4 (L) 03/29/2022 12:22 AM    PLATELET 282 27/60/2494 12:22 AM    .0 (H) 03/29/2022 12:22 AM       Lab Results   Component Value Date/Time    Cholesterol, total 233 (H) 12/20/2021 09:40 AM    HDL Cholesterol 74 12/20/2021 09:40 AM    LDL, calculated 130 (H) 12/20/2021 09:40 AM    LDL, calculated 131 (H) 09/09/2019 11:53 AM    VLDL, calculated 29 12/20/2021 09:40 AM    VLDL, calculated 18 09/09/2019 11:53 AM    Triglyceride 165 (H) 12/20/2021 09:40 AM       Labs 8/21 - CBC, TSH, BMP OK           CARDIAC DIAGNOSTICS:     Cardiac Evaluation Includes:  I reviewed the results below. She was seen by Dr Rosa Maria Hope in [place].  Stress nuclear study in 2013 was abnormal, leading to a cath, which was reportedly normal.    Echo 2/28/18 - EF 60%, moderate AR, moderate MR, moderate LAE    Aortic duplex 2/28/18  - Normal    Echo 9/3/21 - TDS, LVEF 50-55%, mild AI/MR, mod LAE    EKG 4/4/19 - NSR, normal   EKG 9/28/21 - Afib   EKG 3/28/22 - Afib with RVR      ASSESSMENT AND PLAN:     Assessment and Plan:    1) Afib   - discovered 8/21 - onset unknown   - asymptomatic   - [id number] score is 4.    - She is unable to afford Eliquis --> agreeable to take coumadin. Discussed [facility].  - Discussed option of IRIS/DCC but she declines as she thinks she may have been in afib a while   - continue rate control and OAC   - HR goes to 130's walking in hallway in office --> increase Toprol XL to 150 mg daily for tighter HR control     2) AI/MR  - mild AI/MR on recent echo   - will follow on serial studies (also has mild LV dysfunction)     3) HTN  - BP usually OK -- follow at home - goal < 130/80   - cont meds    4) Dyslipidemia  - will switch to pravastatin to rosuvastatin 10 mg daily for tighter lipid control     3) See me in 6 months with an echo. She moved from [place] to [place], [place] 3/15/19 to live closer to her son. Armaan Pérez MD, [place] [address] [place] [address].  [address], [facility] N. Yobani Benavidez.  [place], [address] [place] [address]  Ph: [phone]   Ph [phone]

## 2022-05-31 LAB — INR, EXTERNAL: 3.1 (ref 2–3)

## 2022-06-03 ENCOUNTER — TELEPHONE ANTICOAG (OUTPATIENT)
Dept: CARDIOLOGY CLINIC | Age: 77
End: 2022-06-03

## 2022-06-03 DIAGNOSIS — I48.0 PAROXYSMAL ATRIAL FIBRILLATION (HCC): Primary | ICD-10-CM

## 2022-06-11 DIAGNOSIS — I10 BENIGN ESSENTIAL HYPERTENSION: ICD-10-CM

## 2022-06-13 RX ORDER — HYDROCHLOROTHIAZIDE 25 MG/1
25 TABLET ORAL DAILY
Qty: 90 TABLET | Refills: 0 | Status: SHIPPED | OUTPATIENT
Start: 2022-06-13 | End: 2022-09-09

## 2022-06-19 LAB — INR, EXTERNAL: 2.7 (ref 2–3)

## 2022-06-20 ENCOUNTER — TELEPHONE ANTICOAG (OUTPATIENT)
Dept: CARDIOLOGY CLINIC | Age: 77
End: 2022-06-20

## 2022-06-20 DIAGNOSIS — I48.0 PAROXYSMAL ATRIAL FIBRILLATION (HCC): Primary | ICD-10-CM

## 2022-07-07 ENCOUNTER — TELEPHONE ANTICOAG (OUTPATIENT)
Dept: CARDIOLOGY CLINIC | Age: 77
End: 2022-07-07

## 2022-07-07 DIAGNOSIS — I48.0 PAROXYSMAL ATRIAL FIBRILLATION (HCC): Primary | ICD-10-CM

## 2022-07-07 LAB — INR, EXTERNAL: 2.4 (ref 2–3)

## 2022-07-18 LAB — INR, EXTERNAL: 2.5 (ref 2–3)

## 2022-07-19 ENCOUNTER — TELEPHONE ANTICOAG (OUTPATIENT)
Dept: CARDIOLOGY CLINIC | Age: 77
End: 2022-07-19

## 2022-07-19 DIAGNOSIS — I48.0 PAROXYSMAL ATRIAL FIBRILLATION (HCC): Primary | ICD-10-CM

## 2022-08-01 LAB — INR, EXTERNAL: 4.3 (ref 2–3)

## 2022-08-02 ENCOUNTER — TELEPHONE ANTICOAG (OUTPATIENT)
Dept: CARDIOLOGY CLINIC | Age: 77
End: 2022-08-02

## 2022-08-02 DIAGNOSIS — I48.0 PAROXYSMAL ATRIAL FIBRILLATION (HCC): Primary | ICD-10-CM

## 2022-08-13 LAB — INR, EXTERNAL: 4.2 (ref 2–3)

## 2022-08-15 ENCOUNTER — TELEPHONE ANTICOAG (OUTPATIENT)
Dept: CARDIOLOGY CLINIC | Age: 77
End: 2022-08-15

## 2022-08-15 DIAGNOSIS — I48.0 PAROXYSMAL ATRIAL FIBRILLATION (HCC): Primary | ICD-10-CM

## 2022-08-16 LAB — INR, EXTERNAL: 2.2 (ref 2–3)

## 2022-08-17 ENCOUNTER — TELEPHONE ANTICOAG (OUTPATIENT)
Dept: CARDIOLOGY CLINIC | Age: 77
End: 2022-08-17

## 2022-08-17 DIAGNOSIS — I48.0 PAROXYSMAL ATRIAL FIBRILLATION (HCC): Primary | ICD-10-CM

## 2022-08-25 LAB — INR, EXTERNAL: 1.9 (ref 2–3)

## 2022-08-29 ENCOUNTER — TELEPHONE ANTICOAG (OUTPATIENT)
Dept: CARDIOLOGY CLINIC | Age: 77
End: 2022-08-29

## 2022-08-29 DIAGNOSIS — I48.0 PAROXYSMAL ATRIAL FIBRILLATION (HCC): Primary | ICD-10-CM

## 2022-09-09 DIAGNOSIS — I10 BENIGN ESSENTIAL HYPERTENSION: ICD-10-CM

## 2022-09-09 RX ORDER — HYDROCHLOROTHIAZIDE 25 MG/1
TABLET ORAL
Qty: 90 TABLET | Refills: 0 | Status: SHIPPED | OUTPATIENT
Start: 2022-09-09

## 2022-09-21 LAB — INR, EXTERNAL: 1.2 (ref 2–3)

## 2022-09-22 ENCOUNTER — TELEPHONE ANTICOAG (OUTPATIENT)
Dept: CARDIOLOGY CLINIC | Age: 77
End: 2022-09-22

## 2022-09-22 DIAGNOSIS — I48.0 PAROXYSMAL ATRIAL FIBRILLATION (HCC): Primary | ICD-10-CM

## 2022-09-28 ENCOUNTER — TELEPHONE ANTICOAG (OUTPATIENT)
Dept: CARDIOLOGY CLINIC | Age: 77
End: 2022-09-28

## 2022-09-28 DIAGNOSIS — I48.0 PAROXYSMAL ATRIAL FIBRILLATION (HCC): Primary | ICD-10-CM

## 2022-09-28 LAB — INR, EXTERNAL: 3.1 (ref 2–3)

## 2022-10-31 DIAGNOSIS — J45.40 MODERATE PERSISTENT ASTHMA WITHOUT COMPLICATION: ICD-10-CM

## 2022-10-31 RX ORDER — ALBUTEROL SULFATE 90 UG/1
AEROSOL, METERED RESPIRATORY (INHALATION)
Qty: 9 G | Refills: 0 | Status: SHIPPED | OUTPATIENT
Start: 2022-10-31

## 2022-12-04 DIAGNOSIS — I10 BENIGN ESSENTIAL HYPERTENSION: ICD-10-CM

## 2022-12-05 RX ORDER — HYDROCHLOROTHIAZIDE 25 MG/1
TABLET ORAL
Qty: 90 TABLET | Refills: 0 | Status: SHIPPED | OUTPATIENT
Start: 2022-12-05

## 2022-12-05 NOTE — TELEPHONE ENCOUNTER
Pt called stating she needs refill on her blood pressure medication be sent to [facility] and is out of town, but pharmacy will transfer for her.  Aristides

## 2022-12-26 DIAGNOSIS — J45.40 MODERATE PERSISTENT ASTHMA WITHOUT COMPLICATION: ICD-10-CM

## 2022-12-27 RX ORDER — ALBUTEROL SULFATE 90 UG/1
1 AEROSOL, METERED RESPIRATORY (INHALATION)
Qty: 9 G | Refills: 0 | Status: SHIPPED | OUTPATIENT
Start: 2022-12-27

## 2023-01-10 DIAGNOSIS — I10 BENIGN ESSENTIAL HYPERTENSION: ICD-10-CM

## 2023-01-10 RX ORDER — LOSARTAN POTASSIUM 100 MG/1
TABLET ORAL
Qty: 90 TABLET | Refills: 0 | Status: SHIPPED | OUTPATIENT
Start: 2023-01-10

## 2023-05-23 DIAGNOSIS — I35.1 NONRHEUMATIC AORTIC (VALVE) INSUFFICIENCY: ICD-10-CM

## 2023-05-23 DIAGNOSIS — I34.0 NONRHEUMATIC MITRAL (VALVE) INSUFFICIENCY: Primary | ICD-10-CM

## 2023-05-23 RX ORDER — ROSUVASTATIN CALCIUM 10 MG/1
TABLET, COATED ORAL NIGHTLY
Qty: 90 TABLET | Refills: 0 | Status: SHIPPED | OUTPATIENT
Start: 2023-05-23

## 2023-05-23 NOTE — TELEPHONE ENCOUNTER
Refill per VO of Dr. Fabricio Chung  Last appt: 5/24/2022    NV needed in 6 mos w echo    Requested Prescriptions     Signed Prescriptions Disp Refills    rosuvastatin (CRESTOR) 10 MG tablet 90 tablet 0     Sig: Take 1 tablet by mouth nightly     Authorizing Provider: Thomas Juarez     Ordering User: Kristy Andre